# Patient Record
Sex: FEMALE | Race: WHITE | NOT HISPANIC OR LATINO | Employment: FULL TIME | URBAN - METROPOLITAN AREA
[De-identification: names, ages, dates, MRNs, and addresses within clinical notes are randomized per-mention and may not be internally consistent; named-entity substitution may affect disease eponyms.]

---

## 2024-06-13 ENCOUNTER — OCCMED (OUTPATIENT)
Age: 49
End: 2024-06-13

## 2024-06-13 ENCOUNTER — APPOINTMENT (OUTPATIENT)
Age: 49
End: 2024-06-13

## 2024-06-13 DIAGNOSIS — Z02.1 PHYSICAL EXAM, PRE-EMPLOYMENT: Primary | ICD-10-CM

## 2024-06-13 DIAGNOSIS — Z02.1 PRE-EMPLOYMENT DRUG SCREENING: ICD-10-CM

## 2024-06-13 LAB
MEV IGG SER QL IA: ABNORMAL
MUV IGG SER QL IA: NORMAL
RUBV IGG SERPL IA-ACNC: >450 IU/ML
VZV IGG SER QL IA: NORMAL

## 2024-06-13 PROCEDURE — 86735 MUMPS ANTIBODY: CPT

## 2024-06-13 PROCEDURE — 86765 RUBEOLA ANTIBODY: CPT

## 2024-06-13 PROCEDURE — 86787 VARICELLA-ZOSTER ANTIBODY: CPT

## 2024-06-13 PROCEDURE — 86480 TB TEST CELL IMMUN MEASURE: CPT

## 2024-06-13 PROCEDURE — 86762 RUBELLA ANTIBODY: CPT

## 2024-06-13 PROCEDURE — 36415 COLL VENOUS BLD VENIPUNCTURE: CPT

## 2024-06-14 LAB
GAMMA INTERFERON BACKGROUND BLD IA-ACNC: 0.03 IU/ML
M TB IFN-G BLD-IMP: NEGATIVE
M TB IFN-G CD4+ BCKGRND COR BLD-ACNC: 0.01 IU/ML
M TB IFN-G CD4+ BCKGRND COR BLD-ACNC: 0.01 IU/ML
MITOGEN IGNF BCKGRD COR BLD-ACNC: 9.97 IU/ML

## 2024-07-25 ENCOUNTER — OFFICE VISIT (OUTPATIENT)
Dept: WOUND CARE | Facility: HOSPITAL | Age: 49
End: 2024-07-25
Payer: COMMERCIAL

## 2024-07-25 VITALS
DIASTOLIC BLOOD PRESSURE: 84 MMHG | TEMPERATURE: 96.9 F | HEART RATE: 80 BPM | SYSTOLIC BLOOD PRESSURE: 120 MMHG | RESPIRATION RATE: 16 BRPM

## 2024-07-25 DIAGNOSIS — I83.022 VENOUS STASIS ULCER OF LEFT CALF WITH FAT LAYER EXPOSED, UNSPECIFIED WHETHER VARICOSE VEINS PRESENT (HCC): Primary | ICD-10-CM

## 2024-07-25 DIAGNOSIS — L97.222 VENOUS STASIS ULCER OF LEFT CALF WITH FAT LAYER EXPOSED, UNSPECIFIED WHETHER VARICOSE VEINS PRESENT (HCC): Primary | ICD-10-CM

## 2024-07-25 PROCEDURE — 99204 OFFICE O/P NEW MOD 45 MIN: CPT | Performed by: FAMILY MEDICINE

## 2024-07-25 PROCEDURE — 99213 OFFICE O/P EST LOW 20 MIN: CPT | Performed by: FAMILY MEDICINE

## 2024-07-25 PROCEDURE — 11042 DBRDMT SUBQ TIS 1ST 20SQCM/<: CPT | Performed by: FAMILY MEDICINE

## 2024-07-25 RX ORDER — SIMVASTATIN 20 MG
20 TABLET ORAL DAILY
COMMUNITY

## 2024-07-25 RX ORDER — LIDOCAINE HYDROCHLORIDE 40 MG/ML
5 SOLUTION TOPICAL ONCE
Status: COMPLETED | OUTPATIENT
Start: 2024-07-25 | End: 2024-07-25

## 2024-07-25 RX ORDER — TOPIRAMATE 50 MG/1
50 TABLET, FILM COATED ORAL DAILY
COMMUNITY

## 2024-07-25 RX ORDER — TOPIRAMATE 50 MG/1
50 CAPSULE, EXTENDED RELEASE ORAL DAILY
COMMUNITY

## 2024-07-25 RX ORDER — CETIRIZINE HYDROCHLORIDE 10 MG/1
10 TABLET ORAL DAILY
COMMUNITY

## 2024-07-25 RX ORDER — DESVENLAFAXINE 100 MG/1
100 TABLET, EXTENDED RELEASE ORAL DAILY
COMMUNITY

## 2024-07-25 RX ORDER — MONTELUKAST SODIUM 10 MG/1
10 TABLET ORAL
COMMUNITY

## 2024-07-25 RX ORDER — TRIAMCINOLONE ACETONIDE 55 UG/1
2 SPRAY, METERED NASAL DAILY
COMMUNITY

## 2024-07-25 RX ORDER — LAMOTRIGINE 25 MG/1
25 TABLET ORAL DAILY
COMMUNITY

## 2024-07-25 RX ADMIN — LIDOCAINE HYDROCHLORIDE 5 ML: 40 SOLUTION TOPICAL at 09:01

## 2024-07-25 NOTE — PATIENT INSTRUCTIONS
Orders Placed This Encounter   Procedures    Wound cleansing and dressings Venous Ulcer Left;Lateral Pretibial     Left lower leg wound    Wash your hands with soap and water.  Remove old dressing, discard into plastic bag and place in trash.  Cleanse the wound with soap and water, vashe prior to applying a clean dressing. Do not use tissue or cotton balls. Do not scrub the wound. Pat dry using gauze.  Shower yes   Apply betamethasone to skin surrounding wound today at [facility]  Apply polymem  to the  wound.  Cover with abd  Secure with adelia and tape, surepress  Change dressing every other day    Surepress    Apply compression wrap to your affected Leg(s) from mid-foot to knee making sure to cover the heel. Apply in the morning and re-wrap as needed during the day if wrap becomes loose. Remove at bedtime and elevate legs or lie down.    Avoid prolonged standing in one place.    Elevate leg(s) above the level of the heart when sitting or as much as possible.     Standing Status:   Future     Standing Expiration Date:   8/1/2024      
Photo Preface (Leave Blank If You Do Not Want): Photographs were obtained today
Detail Level: Zone

## 2024-07-25 NOTE — LETTER
July 25, 2024     Roxy Ag MD  [address] [facility]  [address] [place] [address]    Patient: Johanny Rodriguez   YOB: 1975   Date of Visit: 7/25/2024       Dear Dr. Ag:    Thank you for referring Johanny Rodriguez to me for evaluation. Below are my notes for this consultation.    If you have questions, please do not hesitate to call me. I look forward to following your patient along with you.         Sincerely,        Kodak Romero MD, CHT, CWS        CC: No Recipients    Kodak Romero MD  7/25/2024 10:07 AM  Sign when Signing Visit  6Patient ID: Johanny Rodriguez is a 49 y.o. female Date of Birth 1975       Chief Complaint   Patient presents with   • New Patient Visit     Left lower leg wound.       Allergies:  Antihistamines, diphenhydramine-type; Lactose - food allergy; and Latex    Diagnosis:      Diagnosis ICD-10-CM Associated Orders   1. Venous stasis ulcer of left calf with fat layer exposed, unspecified whether varicose veins present ([facility])  I83.022 lidocaine (XYLOCAINE) 4 % topical solution 5 mL    L97.222 Wound cleansing and dressings Venous Ulcer Left;Lateral Pretibial     Wound Procedure Treatment Venous Ulcer Left;Lateral Pretibial     Debridement                Assessment & Plan:  Venous stasis ulcer of the left lateral calf.  Venous duplex from 2023 indicates patent and competent greater and lesser saphenous veins.  There was reflux at the saphenofemoral junctions.  Surgical debridement.  PolyMem with Sure Press every other day.  Follow-up in 1 week.         Subjective:   7/25/2024: First visit for this 49-year-old female who comes to the wound center because of nonhealing ulceration on her left calf.  Patient has a history of ulceration on the same leg in 2023.  At that time she saw wound care in [place] with eventual healing.  She did have venous Duplex Dopplers with deep veins appearing competent bilaterally. The greater and lesser saphenous  veins appear patent and competent bilaterally. Reflux was detected in both   saphenofemoral junctions.  (2023)  In March of this year, she developed a small ulceration which has increased in size.  Over the last month or 2 there is been possible decrease in size slightly.  She describes a burning sensation.  She does wear 15 to 20 mm compression stocking.  Current treatment has been with a bordered, silver foam.  (Mepilex Ag)   There is no history of diabetes.          The following portions of the patient's history were reviewed and updated as appropriate:   There is no problem list on file for this patient.    Past Medical History:   Diagnosis Date   • Hypercholesterolemia      No past surgical history on file.  Family History   Problem Relation Age of Onset   • Heart disease Father    • Colon cancer Maternal Grandmother       Social History     Socioeconomic History   • Marital status: Single     Spouse name: None   • Number of children: None   • Years of education: None   • Highest education level: None   Occupational History   • None   Tobacco Use   • Smoking status: Never   • Smokeless tobacco: None   Vaping Use   • Vaping status: Never Used   Substance and Sexual Activity   • Alcohol use: None   • Drug use: Never   • Sexual activity: None   Other Topics Concern   • None   Social History Narrative   • None     Social Determinants of Health     Financial Resource Strain: Not on file   Food Insecurity: Not on file   Transportation Needs: Not on file   Physical Activity: Not on file   Stress: Not on file   Social Connections: Not on file   Intimate Partner Violence: Not on file   Housing Stability: Not on file        Current Outpatient Medications:   •  cetirizine (ZyrTEC Allergy) 10 mg tablet, Take 10 mg by mouth daily, Disp: , Rfl:   •  desvenlafaxine (PRISTIQ) 100 mg 24 hr tablet, Take 100 mg by mouth daily, Disp: , Rfl:   •  LaMICtal 25 MG tablet, Take 25 mg by mouth daily, Disp: , Rfl:   •  montelukast  (SINGULAIR) 10 mg tablet, Take 10 mg by mouth daily at bedtime, Disp: , Rfl:   •  Multiple Vitamins-Minerals (MULTIVITAMIN ADULTS 50+ PO), Multivitamin, Disp: , Rfl:   •  simvastatin (ZOCOR) 20 mg tablet, Take 20 mg by mouth daily, Disp: , Rfl:   •  topiramate (TOPAMAX) 50 MG tablet, Take 50 mg by mouth daily, Disp: , Rfl:   •  Topiramate ER 50 MG CP24, Take 50 mg by mouth daily, Disp: , Rfl:   •  Triamcinolone Acetonide (Nasacort Allergy) 55 MCG/ACT nasal spray, 2 sprays into each nostril daily, Disp: , Rfl:   No current facility-administered medications for this visit.    Review of Systems   Constitutional:  Negative for appetite change, chills, fatigue, fever and unexpected weight change.   HENT:  Negative for congestion, hearing loss and postnasal drip.    Respiratory:  Negative for cough and shortness of breath.    Cardiovascular:  Positive for leg swelling (Slightly on the left especially at the ankle and area of previous surgery).   Musculoskeletal:  Negative for gait problem.   Skin:  Positive for wound (Left calf). Negative for rash.   Neurological:  Negative for numbness.   Hematological:  Does not bruise/bleed easily.   Psychiatric/Behavioral:  Positive for dysphoric mood. The patient is nervous/anxious.        Objective:  /84   Pulse 80   Temp (!) 96.9 °F (36.1 °C)   Resp 16   Pain Score:   2     Physical Exam  Vitals and nursing note reviewed.   Constitutional:       Appearance: Normal appearance. She is well-developed.   HENT:      Head: Normocephalic and atraumatic.   Cardiovascular:      Rate and Rhythm: Normal rate.   Pulmonary:      Effort: Pulmonary effort is normal.   Musculoskeletal:      Left lower le+ Edema present.   Skin:     General: Skin is warm and dry.      Findings: Erythema and wound present.             Comments: Irregular ulceration with areas of slough and some epithelization.  Surrounding erythema consistent with contact dermatitis due to adhesive.  Small to moderate  "serosanguineous drainage.  No malodor and no signs of infection.  Dusky discoloration lateral to the ulcer.  Areas of hyperpigmentation from previous ulcers.   Neurological:      Mental Status: She is alert and oriented to person, place, and time.   Psychiatric:         Attention and Perception: Attention normal.         Mood and Affect: Mood and affect normal.         Behavior: Behavior is cooperative.         Cognition and Memory: Cognition normal.         Wound 07/25/24 Venous Ulcer Pretibial Left;Lateral (Active)   Wound Image Images linked 07/25/24 0937   Wound Description Granulation tissue;Yellow 07/25/24 0837   Alona-wound Assessment Edema;Erythema 07/25/24 0837   Wound Length (cm) 5.6 cm 07/25/24 0837   Wound Width (cm) 3 cm 07/25/24 0837   Wound Depth (cm) 0.2 cm 07/25/24 0837   Wound Surface Area (cm^2) 16.8 cm^2 07/25/24 0837   Wound Volume (cm^3) 3.36 cm^3 07/25/24 0837   Calculated Wound Volume (cm^3) 3.36 cm^3 07/25/24 0837   Drainage Amount Moderate 07/25/24 0837   Drainage Description Serosanguineous 07/25/24 0837   Non-staged Wound Description Full thickness 07/25/24 0837   Dressing Status Intact 07/25/24 0837        Debridement   Wound 07/25/24 Venous Ulcer Pretibial Left;Lateral    Universal Protocol:  Consent: Verbal consent obtained. Written consent obtained.  Consent given by: patient  Time out: Immediately prior to procedure a \"time out\" was called to verify the correct patient, procedure, equipment, support staff and site/side marked as required.  Patient understanding: patient states understanding of the procedure being performed  Patient identity confirmed: verbally with patient    Debridement Details  Performed by: physician  Debridement type: surgical  Level of debridement: subcutaneous tissue  Pain control: lidocaine 4%      Post-debridement measurements  Length (cm): 5.6  Width (cm): 3  Depth (cm): 0.3  Percent debrided: 100%  Surface Area (cm^2): 16.8  Area Debrided (cm^2): " "16.8  Volume (cm^3): 5.04    Tissue and other material debrided: dermis and subcutaneous tissue  Devitalized tissue debrided: fibrin and slough  Instrument(s) utilized: curette  Bleeding: medium  Hemostasis obtained with: pressure  Procedural pain (0-10): 0  Post-procedural pain: 0   Response to treatment: procedure was tolerated well  Debridement Comments: Irrigation with saline postdebridement.             Lab Results   Component Value Date    HGBA1C 5.5 08/18/2023       Wound Instructions:  Orders Placed This Encounter   Procedures   • Wound cleansing and dressings Venous Ulcer Left;Lateral Pretibial     Left lower leg wound    Wash your hands with soap and water.  Remove old dressing, discard into plastic bag and place in trash.  Cleanse the wound with soap and water, vashe prior to applying a clean dressing. Do not use tissue or cotton balls. Do not scrub the wound. Pat dry using gauze.  Shower yes   Apply betamethasone to skin surrounding wound today at [facility]  Apply polymem  to the  wound.  Cover with abd  Secure with adelia and tape, surepress  Change dressing every other day    Surepress    Apply compression wrap to your affected Leg(s) from mid-foot to knee making sure to cover the heel. Apply in the morning and re-wrap as needed during the day if wrap becomes loose. Remove at bedtime and elevate legs or lie down.    Avoid prolonged standing in one place.    Elevate leg(s) above the level of the heart when sitting or as much as possible.     Standing Status:   Future     Standing Expiration Date:   8/1/2024   • Wound Procedure Treatment Venous Ulcer Left;Lateral Pretibial     This order was created via procedure documentation   • Debridement     This order was created via procedure documentation               Kodak Romero MD, CHT, CWS    Portions of the record may have been created with voice recognition software. Occasional wrong word or \"sound alike\" substitutions may have occurred due to the " inherent limitations of voice recognition software. Read the chart carefully and recognize, using context, where substitutions have occurred.

## 2024-07-25 NOTE — PROGRESS NOTES
Wound Procedure Treatment Venous Ulcer Left;Lateral Pretibial    Performed by: Chelle Tejada RN  Authorized by: Kodak Romero MD    Associated wounds:   Wound 07/25/24 Venous Ulcer Pretibial Left;Lateral  Wound cleansed with:  Soap and water  Applied primary dressing:  Polymem foam  Applied secondary dressing:  ABD  Dressing secured with:  Adelia and Tape  Comments:  Betamethasone applied to naomy wound today.  Polymem applied to wound bed, abd, adelia and tape surepress.

## 2024-07-25 NOTE — LETTER
July 25, 2024     Roxy Ag MD  [address] [facility]  [address] [place] [address]    Patient: Johanny Rodriguez   YOB: 1975   Date of Visit: 7/25/2024       Dear Dr. Ag:    Thank you for referring Johanny Rodriguez to me for evaluation. Below are my notes for this consultation.    If you have questions, please do not hesitate to call me. I look forward to following your patient along with you.         Sincerely,        Kodak Romero MD, CHT, CWS        CC: No Recipients    Kodak Romero MD  7/25/2024 10:07 AM  Sign when Signing Visit  6Patient ID: Johanny Rodriguez is a 49 y.o. female Date of Birth 1975       Chief Complaint   Patient presents with   • New Patient Visit     Left lower leg wound.       Allergies:  Antihistamines, diphenhydramine-type; Lactose - food allergy; and Latex    Diagnosis:      Diagnosis ICD-10-CM Associated Orders   1. Venous stasis ulcer of left calf with fat layer exposed, unspecified whether varicose veins present ([facility])  I83.022 lidocaine (XYLOCAINE) 4 % topical solution 5 mL    L97.222 Wound cleansing and dressings Venous Ulcer Left;Lateral Pretibial     Wound Procedure Treatment Venous Ulcer Left;Lateral Pretibial     Debridement                Assessment & Plan:  Venous stasis ulcer of the left lateral calf.  Venous duplex from 2023 indicates patent and competent greater and lesser saphenous veins.  There was reflux at the saphenofemoral junctions.  Surgical debridement.  PolyMem with Sure Press every other day.  Follow-up in 1 week.         Subjective:   7/25/2024: First visit for this 49-year-old female who comes to the wound center because of nonhealing ulceration on her left calf.  Patient has a history of ulceration on the same leg in 2023.  At that time she saw wound care in [place] with eventual healing.  She did have venous Duplex Dopplers with deep veins appearing competent bilaterally. The greater and lesser saphenous  veins appear patent and competent bilaterally. Reflux was detected in both   saphenofemoral junctions.  (2023)  In March of this year, she developed a small ulceration which has increased in size.  Over the last month or 2 there is been possible decrease in size slightly.  She describes a burning sensation.  She does wear 15 to 20 mm compression stocking.  Current treatment has been with a bordered, silver foam.  (Mepilex Ag)   There is no history of diabetes.          The following portions of the patient's history were reviewed and updated as appropriate:   There is no problem list on file for this patient.    Past Medical History:   Diagnosis Date   • Hypercholesterolemia      No past surgical history on file.  Family History   Problem Relation Age of Onset   • Heart disease Father    • Colon cancer Maternal Grandmother       Social History     Socioeconomic History   • Marital status: Single     Spouse name: None   • Number of children: None   • Years of education: None   • Highest education level: None   Occupational History   • None   Tobacco Use   • Smoking status: Never   • Smokeless tobacco: None   Vaping Use   • Vaping status: Never Used   Substance and Sexual Activity   • Alcohol use: None   • Drug use: Never   • Sexual activity: None   Other Topics Concern   • None   Social History Narrative   • None     Social Determinants of Health     Financial Resource Strain: Not on file   Food Insecurity: Not on file   Transportation Needs: Not on file   Physical Activity: Not on file   Stress: Not on file   Social Connections: Not on file   Intimate Partner Violence: Not on file   Housing Stability: Not on file        Current Outpatient Medications:   •  cetirizine (ZyrTEC Allergy) 10 mg tablet, Take 10 mg by mouth daily, Disp: , Rfl:   •  desvenlafaxine (PRISTIQ) 100 mg 24 hr tablet, Take 100 mg by mouth daily, Disp: , Rfl:   •  LaMICtal 25 MG tablet, Take 25 mg by mouth daily, Disp: , Rfl:   •  montelukast  (SINGULAIR) 10 mg tablet, Take 10 mg by mouth daily at bedtime, Disp: , Rfl:   •  Multiple Vitamins-Minerals (MULTIVITAMIN ADULTS 50+ PO), Multivitamin, Disp: , Rfl:   •  simvastatin (ZOCOR) 20 mg tablet, Take 20 mg by mouth daily, Disp: , Rfl:   •  topiramate (TOPAMAX) 50 MG tablet, Take 50 mg by mouth daily, Disp: , Rfl:   •  Topiramate ER 50 MG CP24, Take 50 mg by mouth daily, Disp: , Rfl:   •  Triamcinolone Acetonide (Nasacort Allergy) 55 MCG/ACT nasal spray, 2 sprays into each nostril daily, Disp: , Rfl:   No current facility-administered medications for this visit.    Review of Systems   Constitutional:  Negative for appetite change, chills, fatigue, fever and unexpected weight change.   HENT:  Negative for congestion, hearing loss and postnasal drip.    Respiratory:  Negative for cough and shortness of breath.    Cardiovascular:  Positive for leg swelling (Slightly on the left especially at the ankle and area of previous surgery).   Musculoskeletal:  Negative for gait problem.   Skin:  Positive for wound (Left calf). Negative for rash.   Neurological:  Negative for numbness.   Hematological:  Does not bruise/bleed easily.   Psychiatric/Behavioral:  Positive for dysphoric mood. The patient is nervous/anxious.        Objective:  /84   Pulse 80   Temp (!) 96.9 °F (36.1 °C)   Resp 16   Pain Score:   2     Physical Exam  Vitals and nursing note reviewed.   Constitutional:       Appearance: Normal appearance. She is well-developed.   HENT:      Head: Normocephalic and atraumatic.   Cardiovascular:      Rate and Rhythm: Normal rate.   Pulmonary:      Effort: Pulmonary effort is normal.   Musculoskeletal:      Left lower le+ Edema present.   Skin:     General: Skin is warm and dry.      Findings: Erythema and wound present.             Comments: Irregular ulceration with areas of slough and some epithelization.  Surrounding erythema consistent with contact dermatitis due to adhesive.  Small to moderate  "serosanguineous drainage.  No malodor and no signs of infection.  Dusky discoloration lateral to the ulcer.  Areas of hyperpigmentation from previous ulcers.   Neurological:      Mental Status: She is alert and oriented to person, place, and time.   Psychiatric:         Attention and Perception: Attention normal.         Mood and Affect: Mood and affect normal.         Behavior: Behavior is cooperative.         Cognition and Memory: Cognition normal.         Wound 07/25/24 Venous Ulcer Pretibial Left;Lateral (Active)   Wound Image Images linked 07/25/24 0937   Wound Description Granulation tissue;Yellow 07/25/24 0837   Alona-wound Assessment Edema;Erythema 07/25/24 0837   Wound Length (cm) 5.6 cm 07/25/24 0837   Wound Width (cm) 3 cm 07/25/24 0837   Wound Depth (cm) 0.2 cm 07/25/24 0837   Wound Surface Area (cm^2) 16.8 cm^2 07/25/24 0837   Wound Volume (cm^3) 3.36 cm^3 07/25/24 0837   Calculated Wound Volume (cm^3) 3.36 cm^3 07/25/24 0837   Drainage Amount Moderate 07/25/24 0837   Drainage Description Serosanguineous 07/25/24 0837   Non-staged Wound Description Full thickness 07/25/24 0837   Dressing Status Intact 07/25/24 0837        Debridement   Wound 07/25/24 Venous Ulcer Pretibial Left;Lateral    Universal Protocol:  Consent: Verbal consent obtained. Written consent obtained.  Consent given by: patient  Time out: Immediately prior to procedure a \"time out\" was called to verify the correct patient, procedure, equipment, support staff and site/side marked as required.  Patient understanding: patient states understanding of the procedure being performed  Patient identity confirmed: verbally with patient    Debridement Details  Performed by: physician  Debridement type: surgical  Level of debridement: subcutaneous tissue  Pain control: lidocaine 4%      Post-debridement measurements  Length (cm): 5.6  Width (cm): 3  Depth (cm): 0.3  Percent debrided: 100%  Surface Area (cm^2): 16.8  Area Debrided (cm^2): " "16.8  Volume (cm^3): 5.04    Tissue and other material debrided: dermis and subcutaneous tissue  Devitalized tissue debrided: fibrin and slough  Instrument(s) utilized: curette  Bleeding: medium  Hemostasis obtained with: pressure  Procedural pain (0-10): 0  Post-procedural pain: 0   Response to treatment: procedure was tolerated well  Debridement Comments: Irrigation with saline postdebridement.             Lab Results   Component Value Date    HGBA1C 5.5 08/18/2023       Wound Instructions:  Orders Placed This Encounter   Procedures   • Wound cleansing and dressings Venous Ulcer Left;Lateral Pretibial     Left lower leg wound    Wash your hands with soap and water.  Remove old dressing, discard into plastic bag and place in trash.  Cleanse the wound with soap and water, vashe prior to applying a clean dressing. Do not use tissue or cotton balls. Do not scrub the wound. Pat dry using gauze.  Shower yes   Apply betamethasone to skin surrounding wound today at [facility]  Apply polymem  to the  wound.  Cover with abd  Secure with adelia and tape, surepress  Change dressing every other day    Surepress    Apply compression wrap to your affected Leg(s) from mid-foot to knee making sure to cover the heel. Apply in the morning and re-wrap as needed during the day if wrap becomes loose. Remove at bedtime and elevate legs or lie down.    Avoid prolonged standing in one place.    Elevate leg(s) above the level of the heart when sitting or as much as possible.     Standing Status:   Future     Standing Expiration Date:   8/1/2024   • Wound Procedure Treatment Venous Ulcer Left;Lateral Pretibial     This order was created via procedure documentation   • Debridement     This order was created via procedure documentation               Kodak Romero MD, CHT, CWS    Portions of the record may have been created with voice recognition software. Occasional wrong word or \"sound alike\" substitutions may have occurred due to the " inherent limitations of voice recognition software. Read the chart carefully and recognize, using context, where substitutions have occurred.

## 2024-07-25 NOTE — PROGRESS NOTES
6Patient ID: Johanny Rodriguez is a 49 y.o. female Date of Birth 1975       Chief Complaint   Patient presents with   • New Patient Visit     Left lower leg wound.       Allergies:  Antihistamines, diphenhydramine-type; Lactose - food allergy; and Latex    Diagnosis:      Diagnosis ICD-10-CM Associated Orders   1. Venous stasis ulcer of left calf with fat layer exposed, unspecified whether varicose veins present ([facility])  I83.022 lidocaine (XYLOCAINE) 4 % topical solution 5 mL    L97.222 Wound cleansing and dressings Venous Ulcer Left;Lateral Pretibial     Wound Procedure Treatment Venous Ulcer Left;Lateral Pretibial     Debridement Venous Ulcer Left;Lateral Pretibial                Assessment & Plan:  Venous stasis ulcer of the left lateral calf.  Venous duplex from 2023 indicates patent and competent greater and lesser saphenous veins.  There was reflux at the saphenofemoral junctions.  Surgical debridement.  PolyMem with Sure Press every other day.  Follow-up in 1 week.         Subjective:   7/25/2024: First visit for this 49-year-old female who comes to the wound center because of nonhealing ulceration on her left calf.  Patient has a history of ulceration on the same leg in 2023.  At that time she saw wound care in [place] with eventual healing.  She did have venous Duplex Dopplers with deep veins appearing competent bilaterally. The greater and lesser saphenous veins appear patent and competent bilaterally. Reflux was detected in both   saphenofemoral junctions.  (2023)  In March of this year, she developed a small ulceration which has increased in size.  Over the last month or 2 there is been possible decrease in size slightly.  She describes a burning sensation.  She does wear 15 to 20 mm compression stocking.  Current treatment has been with a bordered, silver foam.  (Mepilex Ag)   There is no history of diabetes.          The following portions of the patient's history were reviewed and updated as  appropriate:   There is no problem list on file for this patient.    Past Medical History:   Diagnosis Date   • Hypercholesterolemia      No past surgical history on file.  Family History   Problem Relation Age of Onset   • Heart disease Father    • Colon cancer Maternal Grandmother       Social History     Socioeconomic History   • Marital status: Single     Spouse name: None   • Number of children: None   • Years of education: None   • Highest education level: None   Occupational History   • None   Tobacco Use   • Smoking status: Never   • Smokeless tobacco: None   Vaping Use   • Vaping status: Never Used   Substance and Sexual Activity   • Alcohol use: None   • Drug use: Never   • Sexual activity: None   Other Topics Concern   • None   Social History Narrative   • None     Social Determinants of Health     Financial Resource Strain: Not on file   Food Insecurity: Not on file   Transportation Needs: Not on file   Physical Activity: Not on file   Stress: Not on file   Social Connections: Not on file   Intimate Partner Violence: Not on file   Housing Stability: Not on file        Current Outpatient Medications:   •  cetirizine (ZyrTEC Allergy) 10 mg tablet, Take 10 mg by mouth daily, Disp: , Rfl:   •  desvenlafaxine (PRISTIQ) 100 mg 24 hr tablet, Take 100 mg by mouth daily, Disp: , Rfl:   •  LaMICtal 25 MG tablet, Take 25 mg by mouth daily, Disp: , Rfl:   •  montelukast (SINGULAIR) 10 mg tablet, Take 10 mg by mouth daily at bedtime, Disp: , Rfl:   •  Multiple Vitamins-Minerals (MULTIVITAMIN ADULTS 50+ PO), Multivitamin, Disp: , Rfl:   •  simvastatin (ZOCOR) 20 mg tablet, Take 20 mg by mouth daily, Disp: , Rfl:   •  topiramate (TOPAMAX) 50 MG tablet, Take 50 mg by mouth daily, Disp: , Rfl:   •  Topiramate ER 50 MG CP24, Take 50 mg by mouth daily, Disp: , Rfl:   •  Triamcinolone Acetonide (Nasacort Allergy) 55 MCG/ACT nasal spray, 2 sprays into each nostril daily, Disp: , Rfl:   No current facility-administered  medications for this visit.    Review of Systems   Constitutional:  Negative for appetite change, chills, fatigue, fever and unexpected weight change.   HENT:  Negative for congestion, hearing loss and postnasal drip.    Respiratory:  Negative for cough and shortness of breath.    Cardiovascular:  Positive for leg swelling (Slightly on the left especially at the ankle and area of previous surgery).   Musculoskeletal:  Negative for gait problem.   Skin:  Positive for wound (Left calf). Negative for rash.   Neurological:  Negative for numbness.   Hematological:  Does not bruise/bleed easily.   Psychiatric/Behavioral:  Positive for dysphoric mood. The patient is nervous/anxious.        Objective:  /84   Pulse 80   Temp (!) 96.9 °F (36.1 °C)   Resp 16   Pain Score:   2     Physical Exam  Vitals and nursing note reviewed.   Constitutional:       Appearance: Normal appearance. She is well-developed.   HENT:      Head: Normocephalic and atraumatic.   Cardiovascular:      Rate and Rhythm: Normal rate.   Pulmonary:      Effort: Pulmonary effort is normal.   Musculoskeletal:      Left lower le+ Edema present.   Skin:     General: Skin is warm and dry.      Findings: Erythema and wound present.             Comments: Irregular ulceration with areas of slough and some epithelization.  Surrounding erythema consistent with contact dermatitis due to adhesive.  Small to moderate serosanguineous drainage.  No malodor and no signs of infection.  Dusky discoloration lateral to the ulcer.  Areas of hyperpigmentation from previous ulcers.   Neurological:      Mental Status: She is alert and oriented to person, place, and time.   Psychiatric:         Attention and Perception: Attention normal.         Mood and Affect: Mood and affect normal.         Behavior: Behavior is cooperative.         Cognition and Memory: Cognition normal.         Wound 24 Venous Ulcer Pretibial Left;Lateral (Active)   Wound Image Images linked  "07/25/24 0937   Wound Description Granulation tissue;Yellow 07/25/24 0837   Alona-wound Assessment Edema;Erythema 07/25/24 0837   Wound Length (cm) 5.6 cm 07/25/24 0837   Wound Width (cm) 3 cm 07/25/24 0837   Wound Depth (cm) 0.2 cm 07/25/24 0837   Wound Surface Area (cm^2) 16.8 cm^2 07/25/24 0837   Wound Volume (cm^3) 3.36 cm^3 07/25/24 0837   Calculated Wound Volume (cm^3) 3.36 cm^3 07/25/24 0837   Drainage Amount Moderate 07/25/24 0837   Drainage Description Serosanguineous 07/25/24 0837   Non-staged Wound Description Full thickness 07/25/24 0837   Dressing Status Intact 07/25/24 0837        Debridement   Wound 07/25/24 Venous Ulcer Pretibial Left;Lateral    Universal Protocol:  Consent: Verbal consent obtained. Written consent obtained.  Consent given by: patient  Time out: Immediately prior to procedure a \"time out\" was called to verify the correct patient, procedure, equipment, support staff and site/side marked as required.  Patient understanding: patient states understanding of the procedure being performed  Patient identity confirmed: verbally with patient    Debridement Details  Performed by: physician  Debridement type: surgical  Level of debridement: subcutaneous tissue  Pain control: lidocaine 4%      Post-debridement measurements  Length (cm): 5.6  Width (cm): 3  Depth (cm): 0.3  Percent debrided: 100%  Surface Area (cm^2): 16.8  Area Debrided (cm^2): 16.8  Volume (cm^3): 5.04    Tissue and other material debrided: dermis and subcutaneous tissue  Devitalized tissue debrided: fibrin and slough  Instrument(s) utilized: curette  Bleeding: medium  Hemostasis obtained with: pressure  Procedural pain (0-10): 0  Post-procedural pain: 0   Response to treatment: procedure was tolerated well  Debridement Comments: Irrigation with saline postdebridement.             Lab Results   Component Value Date    HGBA1C 5.5 08/18/2023       Wound Instructions:  Orders Placed This Encounter   Procedures   • Wound cleansing " "and dressings Venous Ulcer Left;Lateral Pretibial     Left lower leg wound    Wash your hands with soap and water.  Remove old dressing, discard into plastic bag and place in trash.  Cleanse the wound with soap and water, vashe prior to applying a clean dressing. Do not use tissue or cotton balls. Do not scrub the wound. Pat dry using gauze.  Shower yes   Apply betamethasone to skin surrounding wound today at [facility]  Apply polymem  to the  wound.  Cover with abd  Secure with adelia and tape, surepress  Change dressing every other day    Surepress    Apply compression wrap to your affected Leg(s) from mid-foot to knee making sure to cover the heel. Apply in the morning and re-wrap as needed during the day if wrap becomes loose. Remove at bedtime and elevate legs or lie down.    Avoid prolonged standing in one place.    Elevate leg(s) above the level of the heart when sitting or as much as possible.     Standing Status:   Future     Standing Expiration Date:   8/1/2024   • Wound Procedure Treatment Venous Ulcer Left;Lateral Pretibial     This order was created via procedure documentation   • Debridement Venous Ulcer Left;Lateral Pretibial     This order was created via procedure documentation               Kodak Romero MD, CHT, CWS    Portions of the record may have been created with voice recognition software. Occasional wrong word or \"sound alike\" substitutions may have occurred due to the inherent limitations of voice recognition software. Read the chart carefully and recognize, using context, where substitutions have occurred.    "

## 2024-08-01 ENCOUNTER — OFFICE VISIT (OUTPATIENT)
Dept: WOUND CARE | Facility: HOSPITAL | Age: 49
End: 2024-08-01
Payer: COMMERCIAL

## 2024-08-01 VITALS
DIASTOLIC BLOOD PRESSURE: 82 MMHG | RESPIRATION RATE: 18 BRPM | HEART RATE: 84 BPM | SYSTOLIC BLOOD PRESSURE: 118 MMHG | TEMPERATURE: 98.5 F

## 2024-08-01 DIAGNOSIS — I83.022 VENOUS STASIS ULCER OF LEFT CALF WITH FAT LAYER EXPOSED, UNSPECIFIED WHETHER VARICOSE VEINS PRESENT (HCC): Primary | ICD-10-CM

## 2024-08-01 DIAGNOSIS — L97.222 VENOUS STASIS ULCER OF LEFT CALF WITH FAT LAYER EXPOSED, UNSPECIFIED WHETHER VARICOSE VEINS PRESENT (HCC): Primary | ICD-10-CM

## 2024-08-01 PROCEDURE — 11045 DBRDMT SUBQ TISS EACH ADDL: CPT | Performed by: FAMILY MEDICINE

## 2024-08-01 PROCEDURE — 11042 DBRDMT SUBQ TIS 1ST 20SQCM/<: CPT | Performed by: FAMILY MEDICINE

## 2024-08-01 RX ORDER — LIDOCAINE HYDROCHLORIDE 40 MG/ML
5 SOLUTION TOPICAL ONCE
Status: COMPLETED | OUTPATIENT
Start: 2024-08-01 | End: 2024-08-01

## 2024-08-01 RX ADMIN — LIDOCAINE HYDROCHLORIDE 5 ML: 40 SOLUTION TOPICAL at 15:24

## 2024-08-01 NOTE — PATIENT INSTRUCTIONS
Orders Placed This Encounter   Procedures    Wound cleansing and dressings Venous Ulcer Left;Lateral Pretibial     Left lower leg wound     Wash your hands with soap and water.  Remove old dressing, discard into plastic bag and place in trash. Cleanse the wound with soap and water, vashe prior to applying a clean dressing. Do not use tissue or cotton balls. Do not scrub the wound. Pat dry using gauze.  Shower yes       Apply Endoform AM to wound followed by Hydrafera blue  Cover with abd  Secure with adelia and tape, surepress  Change dressing every other day     Surepress     Apply compression wrap to your affected Leg(s) from mid-foot to knee making sure to cover the heel. Apply in the morning and re-wrap as needed during the day if wrap becomes loose. Remove at bedtime and elevate legs or lie down.     Avoid prolonged standing in one place.     Elevate leg(s) above the level of the heart when sitting or as much as possible.     Standing Status:   Future     Standing Expiration Date:   8/8/2024

## 2024-08-01 NOTE — PROGRESS NOTES
Wound Procedure Treatment Venous Ulcer Left;Lateral Pretibial    Performed by: Ann Marie Luciano RN  Authorized by: Kodak Romero MD    Associated wounds:   Wound 07/25/24 Venous Ulcer Pretibial Left;Lateral  Wound cleansed with:  NSS  Applied primary dressing:  Hydrafera blue and Collagen dressing  Applied secondary dressing:  ABD  Dressing secured with:  Adri, Tape and Compression wrap    Endoform AM applied to wound covered by hydrafera blue    Setopress applied for compression

## 2024-08-01 NOTE — PROGRESS NOTES
Patient ID: Johanny Rodriguez is a 49 y.o. female Date of Birth 1975       Chief Complaint   Patient presents with   • Follow Up Wound Care Visit     Left leg wound       Allergies:  Antihistamines, diphenhydramine-type; Lactose - food allergy; and Latex    Diagnosis:      Diagnosis ICD-10-CM Associated Orders   1. Venous stasis ulcer of left calf with fat layer exposed, unspecified whether varicose veins present ([facility])  I83.022 lidocaine (XYLOCAINE) 4 % topical solution 5 mL    L97.222 Wound cleansing and dressings Venous Ulcer Left;Lateral Pretibial     Wound Procedure Treatment Venous Ulcer Left;Lateral Pretibial     Debridement Venous Ulcer Left;Lateral Pretibial              Assessment & Plan:  Venous stasis ulcer of the left calf.  No significant changes.  Unfortunately, insurance would not cover previously ordered dressings.  Was not notified until today.  Surgical debridement.  Endoform or Julia followed by Hydrofera Blue.  SurePress.  At this time cannot use formal compression wrap because of moderate drainage.  Follow-up in 1 week.         Subjective:   8/1/2024: Follow-up venous stasis ulcer of the left calf.  Unfortunately, she did not receive her ordered dressings.  We only found out today that they were not covered.  Using SurePress but does not really like it.  She notes moderate drainage.  No fever or chills.  No increased pain.    7/25/2024: First visit for this 49-year-old female who comes to the wound center because of nonhealing ulceration on her left calf.  Patient has a history of ulceration on the same leg in 2023.  At that time she saw wound care in [place] with eventual healing.  She did have venous Duplex Dopplers with deep veins appearing competent bilaterally. The greater and lesser saphenous veins appear patent and competent bilaterally. Reflux was detected in both   saphenofemoral junctions.  (2023)  In March of this year, she developed a small ulceration which has increased in  size.  Over the last month or 2 there is been possible decrease in size slightly.  She describes a burning sensation.  She does wear 15 to 20 mm compression stocking.  Current treatment has been with a bordered, silver foam.  (Mepilex Ag)   There is no history of diabetes.          The following portions of the patient's history were reviewed and updated as appropriate:   There is no problem list on file for this patient.    Past Medical History:   Diagnosis Date   • Hypercholesterolemia      No past surgical history on file.  Family History   Problem Relation Age of Onset   • Heart disease Father    • Colon cancer Maternal Grandmother       Social History     Socioeconomic History   • Marital status: Single     Spouse name: Not on file   • Number of children: Not on file   • Years of education: Not on file   • Highest education level: Not on file   Occupational History   • Not on file   Tobacco Use   • Smoking status: Never   • Smokeless tobacco: Not on file   Vaping Use   • Vaping status: Never Used   Substance and Sexual Activity   • Alcohol use: Not on file   • Drug use: Never   • Sexual activity: Not on file   Other Topics Concern   • Not on file   Social History Narrative   • Not on file     Social Determinants of Health     Financial Resource Strain: Not on file   Food Insecurity: Not on file   Transportation Needs: Not on file   Physical Activity: Not on file   Stress: Not on file   Social Connections: Not on file   Intimate Partner Violence: Not on file   Housing Stability: Not on file        Current Outpatient Medications:   •  cetirizine (ZyrTEC Allergy) 10 mg tablet, Take 10 mg by mouth daily, Disp: , Rfl:   •  desvenlafaxine (PRISTIQ) 100 mg 24 hr tablet, Take 100 mg by mouth daily, Disp: , Rfl:   •  LaMICtal 25 MG tablet, Take 25 mg by mouth daily, Disp: , Rfl:   •  montelukast (SINGULAIR) 10 mg tablet, Take 10 mg by mouth daily at bedtime, Disp: , Rfl:   •  Multiple Vitamins-Minerals (MULTIVITAMIN  ADULTS 50+ PO), Multivitamin, Disp: , Rfl:   •  simvastatin (ZOCOR) 20 mg tablet, Take 20 mg by mouth daily, Disp: , Rfl:   •  topiramate (TOPAMAX) 50 MG tablet, Take 50 mg by mouth daily, Disp: , Rfl:   •  Topiramate ER 50 MG CP24, Take 50 mg by mouth daily, Disp: , Rfl:   •  Triamcinolone Acetonide (Nasacort Allergy) 55 MCG/ACT nasal spray, 2 sprays into each nostril daily, Disp: , Rfl:   No current facility-administered medications for this visit.    Review of Systems   Constitutional:  Negative for appetite change, chills, fatigue, fever and unexpected weight change.   HENT:  Negative for congestion, hearing loss and postnasal drip.    Respiratory:  Negative for cough and shortness of breath.    Cardiovascular:  Positive for leg swelling (Slightly on the left especially at the ankle and area of previous surgery).   Musculoskeletal:  Negative for gait problem.   Skin:  Positive for wound (Left calf). Negative for rash.   Neurological:  Negative for numbness.   Hematological:  Does not bruise/bleed easily.   Psychiatric/Behavioral:  Positive for dysphoric mood. The patient is not nervous/anxious.        Objective:  /82   Pulse 84   Temp 98.5 °F (36.9 °C)   Resp 18   Pain Score:   2     Physical Exam  Vitals and nursing note reviewed.   Constitutional:       Appearance: Normal appearance. She is well-developed.   HENT:      Head: Normocephalic and atraumatic.   Cardiovascular:      Rate and Rhythm: Normal rate.   Pulmonary:      Effort: Pulmonary effort is normal.   Musculoskeletal:      Left lower le+ Edema present.   Skin:     General: Skin is warm and dry.      Findings: Erythema and wound present.             Comments: Irregular ulceration with areas of slough.    Moderate serosanguineous drainage.  No malodor and no signs of infection.  Dusky discoloration lateral to the ulcer.  Areas of hyperpigmentation from previous ulcers.   Neurological:      Mental Status: She is alert and oriented to  "person, place, and time.   Psychiatric:         Attention and Perception: Attention normal.         Mood and Affect: Mood and affect normal.         Behavior: Behavior is cooperative.         Cognition and Memory: Cognition normal.         Wound 07/25/24 Venous Ulcer Pretibial Left;Lateral (Active)   Wound Image Images linked 08/01/24 1555   Wound Description Epithelialization;Granulation tissue;Yellow;Hypergranulation 08/01/24 1520   Alona-wound Assessment Edema;Hyperpigmented 08/01/24 1520   Wound Length (cm) 6.5 cm 08/01/24 1520   Wound Width (cm) 3.6 cm 08/01/24 1520   Wound Depth (cm) 0.1 cm 08/01/24 1520   Wound Surface Area (cm^2) 23.4 cm^2 08/01/24 1520   Wound Volume (cm^3) 2.34 cm^3 08/01/24 1520   Calculated Wound Volume (cm^3) 2.34 cm^3 08/01/24 1520   Change in Wound Size % 30.36 08/01/24 1520   Drainage Amount Moderate 08/01/24 1520   Drainage Description Serosanguineous 08/01/24 1520   Non-staged Wound Description Full thickness 08/01/24 1520   Dressing Status Intact 08/01/24 1520        Debridement   Wound 07/25/24 Venous Ulcer Pretibial Left;Lateral    Universal Protocol:  Consent: Verbal consent obtained. Written consent obtained.  Consent given by: patient  Time out: Immediately prior to procedure a \"time out\" was called to verify the correct patient, procedure, equipment, support staff and site/side marked as required.  Patient understanding: patient states understanding of the procedure being performed  Patient identity confirmed: verbally with patient    Debridement Details  Performed by: physician  Debridement type: surgical  Level of debridement: subcutaneous tissue  Pain control: lidocaine 4%      Post-debridement measurements  Length (cm): 6.5  Width (cm): 3.6  Depth (cm): 0.2  Percent debrided: 100%  Surface Area (cm^2): 23.4  Area Debrided (cm^2): 23.4  Volume (cm^3): 4.68    Tissue and other material debrided: dermis and subcutaneous tissue  Devitalized tissue debrided: fibrin and " "acacia  Instrument(s) utilized: curette  Bleeding: small  Hemostasis obtained with: pressure  Procedural pain (0-10): 4  Post-procedural pain: 2   Response to treatment: procedure was tolerated well               Lab Results   Component Value Date    HGBA1C 5.5 08/18/2023       Wound Instructions:  Orders Placed This Encounter   Procedures   • Wound cleansing and dressings Venous Ulcer Left;Lateral Pretibial     Left lower leg wound     Wash your hands with soap and water.  Remove old dressing, discard into plastic bag and place in trash. Cleanse the wound with soap and water, vashe prior to applying a clean dressing. Do not use tissue or cotton balls. Do not scrub the wound. Pat dry using gauze.  Shower yes       Apply Endoform AM to wound followed by Hydrafera blue  Cover with abd  Secure with adelia and tape, surepress  Change dressing every other day     Surepress     Apply compression wrap to your affected Leg(s) from mid-foot to knee making sure to cover the heel. Apply in the morning and re-wrap as needed during the day if wrap becomes loose. Remove at bedtime and elevate legs or lie down.     Avoid prolonged standing in one place.     Elevate leg(s) above the level of the heart when sitting or as much as possible.     Standing Status:   Future     Standing Expiration Date:   8/8/2024   • Wound Procedure Treatment Venous Ulcer Left;Lateral Pretibial     This order was created via procedure documentation   • Debridement Venous Ulcer Left;Lateral Pretibial     This order was created via procedure documentation             Kodak Romero MD, CHT, CWS    Portions of the record may have been created with voice recognition software. Occasional wrong word or \"sound alike\" substitutions may have occurred due to the inherent limitations of voice recognition software. Read the chart carefully and recognize, using context, where substitutions have occurred.      "

## 2024-08-08 ENCOUNTER — OFFICE VISIT (OUTPATIENT)
Dept: WOUND CARE | Facility: HOSPITAL | Age: 49
End: 2024-08-08
Payer: COMMERCIAL

## 2024-08-08 VITALS
RESPIRATION RATE: 16 BRPM | HEART RATE: 78 BPM | DIASTOLIC BLOOD PRESSURE: 90 MMHG | SYSTOLIC BLOOD PRESSURE: 122 MMHG | TEMPERATURE: 98 F

## 2024-08-08 DIAGNOSIS — L97.222 VENOUS STASIS ULCER OF LEFT CALF WITH FAT LAYER EXPOSED, UNSPECIFIED WHETHER VARICOSE VEINS PRESENT (HCC): Primary | ICD-10-CM

## 2024-08-08 DIAGNOSIS — I83.022 VENOUS STASIS ULCER OF LEFT CALF WITH FAT LAYER EXPOSED, UNSPECIFIED WHETHER VARICOSE VEINS PRESENT (HCC): Primary | ICD-10-CM

## 2024-08-08 PROCEDURE — 99213 OFFICE O/P EST LOW 20 MIN: CPT | Performed by: FAMILY MEDICINE

## 2024-08-08 PROCEDURE — 99212 OFFICE O/P EST SF 10 MIN: CPT | Performed by: FAMILY MEDICINE

## 2024-08-08 RX ORDER — LIDOCAINE HYDROCHLORIDE 40 MG/ML
5 SOLUTION TOPICAL ONCE
Status: COMPLETED | OUTPATIENT
Start: 2024-08-08 | End: 2024-08-08

## 2024-08-08 RX ADMIN — LIDOCAINE HYDROCHLORIDE 5 ML: 40 SOLUTION TOPICAL at 15:50

## 2024-08-08 NOTE — PATIENT INSTRUCTIONS
Orders Placed This Encounter   Procedures    Wound cleansing and dressings Venous Ulcer Left;Lateral Pretibial     Left lower leg wound     Wash your hands with soap and water.  Remove old dressing, discard into plastic bag and place in trash. Cleanse the wound with soap and water, vashe prior to applying a clean dressing. Do not use tissue or cotton balls. Do not scrub the wound. Pat dry using gauze.  Shower yes         Apply Hydrafera blue  Cover with abd  Secure with adelia and tape  Change dressing 2 x week     Standing Status:   Future     Standing Expiration Date:   8/15/2024    Wound cleansing and dressings Venous Ulcer Left;Lateral Pretibial     Compression Stocking 20-30 mmhg    Remove compression stockings every night HS and re-apply first thing qAM. Follow daily skin care as instructed.    Avoid prolonged standing in one place.    Elevate leg(s) above the level of the heart when sitting or as much as possible.     Standing Status:   Future     Standing Expiration Date:   8/15/2024

## 2024-08-08 NOTE — PROGRESS NOTES
Wound Procedure Treatment Venous Ulcer Left;Lateral Pretibial    Performed by: Rekha Frazier RN  Authorized by: Kodak Romero MD    Associated wounds:   Wound 07/25/24 Venous Ulcer Pretibial Left;Lateral  Wound cleansed with:  NSS  Applied primary dressing:  Hydrafera blue ready  Applied secondary dressing:  ABD  Dressing secured with:  Adri and Tape

## 2024-08-08 NOTE — PROGRESS NOTES
Patient ID: Johanny Rodriguez is a 49 y.o. female Date of Birth 1975       Chief Complaint   Patient presents with   • Follow Up Wound Care Visit     Left leg wound. Wearing compression stocking.       Allergies:  Antihistamines, diphenhydramine-type; Lactose - food allergy; and Latex    Diagnosis:      Diagnosis ICD-10-CM Associated Orders   1. Venous stasis ulcer of left calf with fat layer exposed, unspecified whether varicose veins present ([facility])  I83.022 lidocaine (XYLOCAINE) 4 % topical solution 5 mL    L97.222 Wound cleansing and dressings Venous Ulcer Left;Lateral Pretibial     Wound cleansing and dressings Venous Ulcer Left;Lateral Pretibial     Wound Procedure Treatment Venous Ulcer Left;Lateral Pretibial              Assessment & Plan:  Venous stasis ulcer of the left lower extremity.  Overall dimensions are not improved but there is increased epithelization centrally.  Less slough.  Hold endoform for now.  Hydrofera Blue.  20-30 mmHg compression stocking is currently being used.  Change twice a week.         Subjective:   8/8/2024: Follow-up venous stasis ulcer of the left calf.  Once again, she has not received her ordered dressings.  We have not heard from the company nor has the patient.  She did get 20 to 30 mmHg compression stocking and is using that.  Hydrofera Blue and endoform.    8/1/2024: Follow-up venous stasis ulcer of the left calf.  Unfortunately, she did not receive her ordered dressings.  We only found out today that they were not covered.  Using SurePress but does not really like it.  She notes moderate drainage.  No fever or chills.  No increased pain.    7/25/2024: First visit for this 49-year-old female who comes to the wound center because of nonhealing ulceration on her left calf.  Patient has a history of ulceration on the same leg in 2023.  At that time she saw wound care in [place] with eventual healing.  She did have venous Duplex Dopplers with deep veins appearing competent  bilaterally. The greater and lesser saphenous veins appear patent and competent bilaterally. Reflux was detected in both   saphenofemoral junctions.  (2023)  In March of this year, she developed a small ulceration which has increased in size.  Over the last month or 2 there is been possible decrease in size slightly.  She describes a burning sensation.  She does wear 15 to 20 mm compression stocking.  Current treatment has been with a bordered, silver foam.  (Mepilex Ag)   There is no history of diabetes.          The following portions of the patient's history were reviewed and updated as appropriate:   There is no problem list on file for this patient.    Past Medical History:   Diagnosis Date   • Hypercholesterolemia      No past surgical history on file.  Family History   Problem Relation Age of Onset   • Heart disease Father    • Colon cancer Maternal Grandmother       Social History     Socioeconomic History   • Marital status: Single     Spouse name: Not on file   • Number of children: Not on file   • Years of education: Not on file   • Highest education level: Not on file   Occupational History   • Not on file   Tobacco Use   • Smoking status: Never   • Smokeless tobacco: Not on file   Vaping Use   • Vaping status: Never Used   Substance and Sexual Activity   • Alcohol use: Not on file   • Drug use: Never   • Sexual activity: Not on file   Other Topics Concern   • Not on file   Social History Narrative   • Not on file     Social Determinants of Health     Financial Resource Strain: Not on file   Food Insecurity: Not on file   Transportation Needs: Not on file   Physical Activity: Not on file   Stress: Not on file   Social Connections: Not on file   Intimate Partner Violence: Not on file   Housing Stability: Not on file        Current Outpatient Medications:   •  cetirizine (ZyrTEC Allergy) 10 mg tablet, Take 10 mg by mouth daily, Disp: , Rfl:   •  desvenlafaxine (PRISTIQ) 100 mg 24 hr tablet, Take 100 mg by  mouth daily, Disp: , Rfl:   •  LaMICtal 25 MG tablet, Take 25 mg by mouth daily, Disp: , Rfl:   •  montelukast (SINGULAIR) 10 mg tablet, Take 10 mg by mouth daily at bedtime, Disp: , Rfl:   •  Multiple Vitamins-Minerals (MULTIVITAMIN ADULTS 50+ PO), Multivitamin, Disp: , Rfl:   •  simvastatin (ZOCOR) 20 mg tablet, Take 20 mg by mouth daily, Disp: , Rfl:   •  topiramate (TOPAMAX) 50 MG tablet, Take 50 mg by mouth daily, Disp: , Rfl:   •  Topiramate ER 50 MG CP24, Take 50 mg by mouth daily, Disp: , Rfl:   •  Triamcinolone Acetonide (Nasacort Allergy) 55 MCG/ACT nasal spray, 2 sprays into each nostril daily, Disp: , Rfl:   No current facility-administered medications for this visit.    Review of Systems   Constitutional:  Negative for appetite change, chills, fatigue, fever and unexpected weight change.   HENT:  Negative for congestion, hearing loss and postnasal drip.    Respiratory:  Negative for cough and shortness of breath.    Cardiovascular:  Positive for leg swelling (Improved and less swelling at ankle).   Musculoskeletal:  Negative for gait problem.   Skin:  Positive for wound (Left calf). Negative for rash.   Neurological:  Negative for numbness.   Hematological:  Does not bruise/bleed easily.   Psychiatric/Behavioral:  Positive for dysphoric mood. The patient is not nervous/anxious.        Objective:  /90   Pulse 78   Temp 98 °F (36.7 °C)   Resp 16   Pain Score:   4     Physical Exam  Vitals and nursing note reviewed.   Constitutional:       Appearance: Normal appearance. She is well-developed.   HENT:      Head: Normocephalic and atraumatic.   Cardiovascular:      Rate and Rhythm: Normal rate.   Pulmonary:      Effort: Pulmonary effort is normal.   Musculoskeletal:      Left lower le+ Edema present.   Skin:     General: Skin is warm and dry.      Findings: Wound present. No erythema.             Comments: Irregular ulceration with areas of slough but less than previous.  Increased  epithelization centrally.  Overall dimensions slightly larger.  Moderate serosanguineous drainage.  No malodor and no signs of infection.  Dusky discoloration lateral to the ulcer.  Areas of hyperpigmentation from previous ulcers.   Neurological:      Mental Status: She is alert and oriented to person, place, and time.   Psychiatric:         Attention and Perception: Attention normal.         Mood and Affect: Mood and affect normal.         Behavior: Behavior is cooperative.         Cognition and Memory: Cognition normal.         Wound 07/25/24 Venous Ulcer Pretibial Left;Lateral (Active)   Wound Image Images linked 08/08/24 1548   Wound Description Epithelialization;Granulation tissue;Yellow;Hypergranulation 08/08/24 1548   Alona-wound Assessment Edema;Hyperpigmented 08/08/24 1548   Wound Length (cm) 6.5 cm 08/08/24 1548   Wound Width (cm) 4 cm 08/08/24 1548   Wound Depth (cm) 0.1 cm 08/08/24 1548   Wound Surface Area (cm^2) 26 cm^2 08/08/24 1548   Wound Volume (cm^3) 2.6 cm^3 08/08/24 1548   Calculated Wound Volume (cm^3) 2.6 cm^3 08/08/24 1548   Change in Wound Size % 22.62 08/08/24 1548   Drainage Amount Moderate 08/08/24 1548   Drainage Description Serosanguineous 08/08/24 1548   Non-staged Wound Description Full thickness 08/08/24 1548   Dressing Status Intact 08/08/24 1548                  Lab Results   Component Value Date    HGBA1C 5.5 08/18/2023       Wound Instructions:  Orders Placed This Encounter   Procedures   • Wound cleansing and dressings Venous Ulcer Left;Lateral Pretibial     Left lower leg wound     Wash your hands with soap and water.  Remove old dressing, discard into plastic bag and place in trash. Cleanse the wound with soap and water, vashe prior to applying a clean dressing. Do not use tissue or cotton balls. Do not scrub the wound. Pat dry using gauze.  Shower yes         Apply Hydrafera blue  Cover with abd  Secure with adelia and tape  Change dressing 2 x week     Standing Status:    "Future     Standing Expiration Date:   8/15/2024   • Wound cleansing and dressings Venous Ulcer Left;Lateral Pretibial     Compression Stocking 20-30 mmhg    Remove compression stockings every night HS and re-apply first thing qAM. Follow daily skin care as instructed.    Avoid prolonged standing in one place.    Elevate leg(s) above the level of the heart when sitting or as much as possible.     Standing Status:   Future     Standing Expiration Date:   8/15/2024   • Wound Procedure Treatment Venous Ulcer Left;Lateral Pretibial     This order was created via procedure documentation               Kodak Romero MD, CHT, CWS    Portions of the record may have been created with voice recognition software. Occasional wrong word or \"sound alike\" substitutions may have occurred due to the inherent limitations of voice recognition software. Read the chart carefully and recognize, using context, where substitutions have occurred.    "

## 2024-08-15 ENCOUNTER — OFFICE VISIT (OUTPATIENT)
Dept: WOUND CARE | Facility: HOSPITAL | Age: 49
End: 2024-08-15
Payer: COMMERCIAL

## 2024-08-15 VITALS — TEMPERATURE: 96.9 F | DIASTOLIC BLOOD PRESSURE: 88 MMHG | SYSTOLIC BLOOD PRESSURE: 124 MMHG | RESPIRATION RATE: 18 BRPM

## 2024-08-15 DIAGNOSIS — I83.022 VENOUS STASIS ULCER OF LEFT CALF WITH FAT LAYER EXPOSED, UNSPECIFIED WHETHER VARICOSE VEINS PRESENT (HCC): Primary | ICD-10-CM

## 2024-08-15 DIAGNOSIS — L97.222 VENOUS STASIS ULCER OF LEFT CALF WITH FAT LAYER EXPOSED, UNSPECIFIED WHETHER VARICOSE VEINS PRESENT (HCC): Primary | ICD-10-CM

## 2024-08-15 PROCEDURE — 97597 DBRDMT OPN WND 1ST 20 CM/<: CPT | Performed by: FAMILY MEDICINE

## 2024-08-15 PROCEDURE — 97598 DBRDMT OPN WND ADDL 20CM/<: CPT | Performed by: FAMILY MEDICINE

## 2024-08-15 RX ORDER — LIDOCAINE 40 MG/G
CREAM TOPICAL ONCE
Status: COMPLETED | OUTPATIENT
Start: 2024-08-15 | End: 2024-08-15

## 2024-08-15 RX ADMIN — LIDOCAINE: 40 CREAM TOPICAL at 16:32

## 2024-08-15 NOTE — PROGRESS NOTES
Patient ID: Johanny Rodriguez is a 49 y.o. female Date of Birth 1975       Chief Complaint   Patient presents with   • Follow Up Wound Care Visit     Left leg wound       Allergies:  Antihistamines, diphenhydramine-type; Lactose - food allergy; and Latex    Diagnosis:      Diagnosis ICD-10-CM Associated Orders   1. Venous stasis ulcer of left calf with fat layer exposed, unspecified whether varicose veins present ([facility])  I83.022 lidocaine (LMX) 4 % cream    L97.222 Wound cleansing and dressings Venous Ulcer Left;Lateral Pretibial     Wound cleansing and dressings Venous Ulcer Left;Lateral Pretibial     Wound Procedure Treatment Venous Ulcer Left;Lateral Pretibial     Ambulatory referral to Vascular Surgery     Debridement Venous Ulcer Left;Lateral Pretibial              Assessment & Plan:  Venous stasis ulcer of the left calf improving.  Endoform or Julia followed by Hydrofera Blue (Mepilex border today) change 3 times a week.  If Hydrofera Blue is sticking, change twice a week.  Compression stocking.  Referral to vascular surgery.  This is due to recurring venous ulcers in the left calf over the years.         Subjective:   8/15/2024: Follow-up venous stasis ulcer of the left calf.  She still has not received her order.  She does have some Hydrofera Blue and endoform.  The Hydrofera Blue is been sticking.  She is wearing her compression stocking.    8/8/2024: Follow-up venous stasis ulcer of the left calf.  Once again, she has not received her ordered dressings.  We have not heard from the company nor has the patient.  She did get 20 to 30 mmHg compression stocking and is using that.  Hydrofera Blue and endoform.    8/1/2024: Follow-up venous stasis ulcer of the left calf.  Unfortunately, she did not receive her ordered dressings.  We only found out today that they were not covered.  Using SurePress but does not really like it.  She notes moderate drainage.  No fever or chills.  No increased pain.    7/25/2024:  First visit for this 49-year-old female who comes to the wound center because of nonhealing ulceration on her left calf.  Patient has a history of ulceration on the same leg in 2023.  At that time she saw wound care in [place] with eventual healing.  She did have venous Duplex Dopplers with deep veins appearing competent bilaterally. The greater and lesser saphenous veins appear patent and competent bilaterally. Reflux was detected in both   saphenofemoral junctions.  (2023)  In March of this year, she developed a small ulceration which has increased in size.  Over the last month or 2 there is been possible decrease in size slightly.  She describes a burning sensation.  She does wear 15 to 20 mm compression stocking.  Current treatment has been with a bordered, silver foam.  (Mepilex Ag)   There is no history of diabetes.          The following portions of the patient's history were reviewed and updated as appropriate:   There is no problem list on file for this patient.    Past Medical History:   Diagnosis Date   • Hypercholesterolemia      No past surgical history on file.  Family History   Problem Relation Age of Onset   • Heart disease Father    • Colon cancer Maternal Grandmother       Social History     Socioeconomic History   • Marital status: Single     Spouse name: Not on file   • Number of children: Not on file   • Years of education: Not on file   • Highest education level: Not on file   Occupational History   • Not on file   Tobacco Use   • Smoking status: Never   • Smokeless tobacco: Not on file   Vaping Use   • Vaping status: Never Used   Substance and Sexual Activity   • Alcohol use: Not on file   • Drug use: Never   • Sexual activity: Not on file   Other Topics Concern   • Not on file   Social History Narrative   • Not on file     Social Determinants of Health     Financial Resource Strain: Not on file   Food Insecurity: Not on file   Transportation Needs: Not on file   Physical Activity: Not on  file   Stress: Not on file   Social Connections: Not on file   Intimate Partner Violence: Not on file   Housing Stability: Not on file        Current Outpatient Medications:   •  cetirizine (ZyrTEC Allergy) 10 mg tablet, Take 10 mg by mouth daily, Disp: , Rfl:   •  desvenlafaxine (PRISTIQ) 100 mg 24 hr tablet, Take 100 mg by mouth daily, Disp: , Rfl:   •  LaMICtal 25 MG tablet, Take 25 mg by mouth daily, Disp: , Rfl:   •  montelukast (SINGULAIR) 10 mg tablet, Take 10 mg by mouth daily at bedtime, Disp: , Rfl:   •  Multiple Vitamins-Minerals (MULTIVITAMIN ADULTS 50+ PO), Multivitamin, Disp: , Rfl:   •  simvastatin (ZOCOR) 20 mg tablet, Take 20 mg by mouth daily, Disp: , Rfl:   •  topiramate (TOPAMAX) 50 MG tablet, Take 50 mg by mouth daily, Disp: , Rfl:   •  Topiramate ER 50 MG CP24, Take 50 mg by mouth daily, Disp: , Rfl:   •  Triamcinolone Acetonide (Nasacort Allergy) 55 MCG/ACT nasal spray, 2 sprays into each nostril daily, Disp: , Rfl:   No current facility-administered medications for this visit.    Review of Systems   Constitutional:  Negative for chills and fever.   HENT:  Negative for congestion and hearing loss.    Respiratory:  Negative for cough.    Cardiovascular:  Positive for leg swelling (Improved and less swelling at ankle).   Musculoskeletal:  Negative for gait problem.   Skin:  Positive for wound (Left calf). Negative for rash.   Neurological:  Negative for numbness.   Hematological:  Does not bruise/bleed easily.   Psychiatric/Behavioral:  Positive for dysphoric mood. The patient is not nervous/anxious.        Objective:  /88   Temp (!) 96.9 °F (36.1 °C)   Resp 18         Physical Exam  Vitals and nursing note reviewed.   Constitutional:       Appearance: Normal appearance. She is well-developed.   HENT:      Head: Normocephalic and atraumatic.   Cardiovascular:      Rate and Rhythm: Normal rate.   Pulmonary:      Effort: Pulmonary effort is normal.   Musculoskeletal:      Left lower leg:  "1+ Edema present.   Skin:     General: Skin is warm and dry.      Findings: Wound present. No erythema.             Comments: Irregular ulceration with areas of slough but less than previous.  Increased epithelization centrally.  Overall dimensions unchanged.  Moderate serosanguineous drainage.  No malodor and no signs of infection.  Dusky discoloration lateral to the ulcer.  Areas of hyperpigmentation from previous ulcers.   Neurological:      Mental Status: She is alert and oriented to person, place, and time.   Psychiatric:         Attention and Perception: Attention normal.         Mood and Affect: Mood and affect normal.         Behavior: Behavior is cooperative.         Cognition and Memory: Cognition normal.         Wound 07/25/24 Venous Ulcer Pretibial Left;Lateral (Active)   Wound Image Images linked 08/15/24 1604   Wound Description Epithelialization;Yellow;Granulation tissue;Slough 08/15/24 1604   Alona-wound Assessment Edema;Hyperpigmented 08/15/24 1604   Wound Length (cm) 6.2 cm 08/15/24 1604   Wound Width (cm) 4 cm 08/15/24 1604   Wound Depth (cm) 0.1 cm 08/15/24 1604   Wound Surface Area (cm^2) 24.8 cm^2 08/15/24 1604   Wound Volume (cm^3) 2.48 cm^3 08/15/24 1604   Calculated Wound Volume (cm^3) 2.48 cm^3 08/15/24 1604   Change in Wound Size % 26.19 08/15/24 1604   Drainage Amount Moderate 08/15/24 1604   Drainage Description Serosanguineous 08/15/24 1604   Non-staged Wound Description Full thickness 08/15/24 1604   Dressing Status Intact 08/15/24 1604        Debridement   Wound 07/25/24 Venous Ulcer Pretibial Left;Lateral    Universal Protocol:  procedure performed by consultantConsent: Verbal consent obtained. Written consent obtained.  Consent given by: patient  Time out: Immediately prior to procedure a \"time out\" was called to verify the correct patient, procedure, equipment, support staff and site/side marked as required.  Patient understanding: patient states understanding of the procedure being " performed  Patient identity confirmed: verbally with patient    Debridement Details  Performed by: physician  Debridement type: selective  Pain control: lidocaine 4%      Post-debridement measurements  Length (cm): 6.2  Width (cm): 4  Depth (cm): 0.1  Percent debrided: 100%  Surface Area (cm^2): 24.8  Area Debrided (cm^2): 24.8  Volume (cm^3): 2.48    Tissue and other material debrided: dermis  Devitalized tissue debrided: fibrin and slough  Instrument(s) utilized: curette  Bleeding: small  Hemostasis obtained with: pressure  Procedural pain (0-10): 0  Post-procedural pain: 0   Response to treatment: procedure was tolerated well               Lab Results   Component Value Date    HGBA1C 5.5 08/18/2023       Wound Instructions:  Orders Placed This Encounter   Procedures   • Wound cleansing and dressings Venous Ulcer Left;Lateral Pretibial     Left lower leg wound     Wash your hands with soap and water.  Remove old dressing, discard into plastic bag and place in trash. Cleanse the wound with soap and water, vashe prior to applying a clean dressing. Do not use tissue or cotton balls. Do not scrub the wound. Pat dry using gauze.  Shower yes         Apply collagen (either puracol or Endoform) then apply Hydrafera blue  Cover with abd  Secure with adelia and tape  Change dressing 2-3 x week (If Hydrofera is sticking try changing the dressing less frequently)     Standing Status:   Future     Standing Expiration Date:   8/22/2024   • Wound cleansing and dressings Venous Ulcer Left;Lateral Pretibial     Compression Stocking 20-30 mmhg    Remove compression stockings every night HS and re-apply first thing qAM. Follow daily skin care as instructed.    Avoid prolonged standing in one place.    Elevate leg(s) above the level of the heart when sitting or as much as possible.     Standing Status:   Future     Standing Expiration Date:   8/22/2024   • Wound Procedure Treatment Venous Ulcer Left;Lateral Pretibial     This order  "was created via procedure documentation   • Debridement Venous Ulcer Left;Lateral Pretibial     This order was created via procedure documentation   • Ambulatory referral to Vascular Surgery     Standing Status:   Future     Standing Expiration Date:   8/15/2025     Referral Priority:   Routine     Referral Type:   Consult - AMB     Referral Reason:   Specialty Services Required     Requested Specialty:   Vascular Surgery     Number of Visits Requested:   1     Expiration Date:   8/15/2025             Kodak Romero MD, CHT, CWS    Portions of the record may have been created with voice recognition software. Occasional wrong word or \"sound alike\" substitutions may have occurred due to the inherent limitations of voice recognition software. Read the chart carefully and recognize, using context, where substitutions have occurred.      "

## 2024-08-15 NOTE — PATIENT INSTRUCTIONS
Orders Placed This Encounter   Procedures    Wound cleansing and dressings Venous Ulcer Left;Lateral Pretibial     Left lower leg wound     Wash your hands with soap and water.  Remove old dressing, discard into plastic bag and place in trash. Cleanse the wound with soap and water, vashe prior to applying a clean dressing. Do not use tissue or cotton balls. Do not scrub the wound. Pat dry using gauze.  Shower yes         Apply collagen (either puracol or Endoform) then apply Hydrafera blue  Cover with abd  Secure with adelia and tape  Change dressing 2-3 x week (If Hydrofera is sticking try changing the dressing less frequently)     Standing Status:   Future     Standing Expiration Date:   8/22/2024    Wound cleansing and dressings Venous Ulcer Left;Lateral Pretibial     Compression Stocking 20-30 mmhg    Remove compression stockings every night HS and re-apply first thing qAM. Follow daily skin care as instructed.    Avoid prolonged standing in one place.    Elevate leg(s) above the level of the heart when sitting or as much as possible.     Standing Status:   Future     Standing Expiration Date:   8/22/2024    Wound Procedure Treatment Venous Ulcer Left;Lateral Pretibial     This order was created via procedure documentation    Ambulatory referral to Vascular Surgery     Standing Status:   Future     Standing Expiration Date:   8/15/2025     Referral Priority:   Routine     Referral Type:   Consult - AMB     Referral Reason:   Specialty Services Required     Requested Specialty:   Vascular Surgery     Number of Visits Requested:   1     Expiration Date:   8/15/2025

## 2024-08-15 NOTE — PROGRESS NOTES
Wound Procedure Treatment Venous Ulcer Left;Lateral Pretibial    Performed by: Rekha Frazier RN  Authorized by: Kodak Romero MD    Associated wounds:   Wound 07/25/24 Venous Ulcer Pretibial Left;Lateral  Wound cleansed with:  Soap and water  Applied primary dressing:  Collagen dressing, Silver and Silicone bordered foam

## 2024-08-19 ENCOUNTER — TELEPHONE (OUTPATIENT)
Dept: WOUND CARE | Facility: HOSPITAL | Age: 49
End: 2024-08-19

## 2024-08-19 NOTE — TELEPHONE ENCOUNTER
Spoke with Kedar at [facility] to clarify supply orders. [facility] does not cover Hydrofera Blue and can substitute with DermaBlue. Requested order get rushed since patient has been waiting for supplies. Kedar states this order still needs to be approved on their end and then will be shipped out in 3 days if approved.

## 2024-08-22 ENCOUNTER — OFFICE VISIT (OUTPATIENT)
Dept: WOUND CARE | Facility: HOSPITAL | Age: 49
End: 2024-08-22
Payer: COMMERCIAL

## 2024-08-22 VITALS
RESPIRATION RATE: 16 BRPM | DIASTOLIC BLOOD PRESSURE: 70 MMHG | HEART RATE: 72 BPM | TEMPERATURE: 96.6 F | SYSTOLIC BLOOD PRESSURE: 110 MMHG

## 2024-08-22 DIAGNOSIS — L97.222 VENOUS STASIS ULCER OF LEFT CALF WITH FAT LAYER EXPOSED, UNSPECIFIED WHETHER VARICOSE VEINS PRESENT (HCC): Primary | ICD-10-CM

## 2024-08-22 DIAGNOSIS — I83.022 VENOUS STASIS ULCER OF LEFT CALF WITH FAT LAYER EXPOSED, UNSPECIFIED WHETHER VARICOSE VEINS PRESENT (HCC): Primary | ICD-10-CM

## 2024-08-22 PROCEDURE — 97597 DBRDMT OPN WND 1ST 20 CM/<: CPT

## 2024-08-22 RX ORDER — LIDOCAINE HYDROCHLORIDE 40 MG/ML
5 SOLUTION TOPICAL ONCE
Status: COMPLETED | OUTPATIENT
Start: 2024-08-22 | End: 2024-08-22

## 2024-08-22 RX ADMIN — LIDOCAINE HYDROCHLORIDE 5 ML: 40 SOLUTION TOPICAL at 15:39

## 2024-08-22 NOTE — PROGRESS NOTES
Patient ID: Johanny Rodriguez is a 49 y.o. female Date of Birth 1975       Chief Complaint   Patient presents with    Follow Up Wound Care Visit     Left leg ulcer       Allergies:  Antihistamines, diphenhydramine-type; Lactose - food allergy; and Latex    Diagnosis:      Diagnosis ICD-10-CM Associated Orders   1. Venous stasis ulcer of left calf with fat layer exposed, unspecified whether varicose veins present ([facility])  I83.022 lidocaine (XYLOCAINE) 4 % topical solution 5 mL    L97.222 Wound cleansing and dressings Venous Ulcer Left;Lateral Pretibial     Wound cleansing and dressings Venous Ulcer Left;Lateral Pretibial     Wound Procedure Treatment Venous Ulcer Left;Lateral Pretibial     Debridement Venous Ulcer Left;Lateral Pretibial              Assessment & Plan:  Left lower extremity full-thickness venous ulcer.  Wound is measuring slightly smaller on exam today with noted increased epithelialization within the measured wound bed.  Will recommend to continue with current wound management of endoform and Hydrofera Blue.  Continue with patient's personal compression garment.   Wound is not showing any s/s of clinical infection.   Debrided as below.   Elevate lower extremities and avoid prolonged standing for edema management.   Follow-up in 2 weeks. Call sooner with questions or concerns or any signs of infection such as redness, swelling, increased/purulent drainage, fever or chills, and increased pain.  Patient verbalized understanding and agrees with plan of care.           Subjective:   8/22/24: Patient presents to wound healing center for follow-up visit of left lower extremity venous ulcer.  Patient has been using endoform and Hydrofera Blue to the wound.  Patient states that she has had issues with obtaining wound care supplies through her vendor however this has been rectified and she has received her supplies.  Patient wears her own compression and is tolerating it well.  Patient offers no wound related  complaints, denies increased pain or drainage.  Denies fever or chills.        The following portions of the patient's history were reviewed and updated as appropriate:   There is no problem list on file for this patient.    Past Medical History:   Diagnosis Date    Hypercholesterolemia      No past surgical history on file.  Family History   Problem Relation Age of Onset    Heart disease Father     Colon cancer Maternal Grandmother       Social History     Socioeconomic History    Marital status: Single     Spouse name: Not on file    Number of children: Not on file    Years of education: Not on file    Highest education level: Not on file   Occupational History    Not on file   Tobacco Use    Smoking status: Never    Smokeless tobacco: Not on file   Vaping Use    Vaping status: Never Used   Substance and Sexual Activity    Alcohol use: Not on file    Drug use: Never    Sexual activity: Not on file   Other Topics Concern    Not on file   Social History Narrative    Not on file     Social Determinants of Health     Financial Resource Strain: Not on file   Food Insecurity: Not on file   Transportation Needs: Not on file   Physical Activity: Not on file   Stress: Not on file   Social Connections: Not on file   Intimate Partner Violence: Not on file   Housing Stability: Not on file        Current Outpatient Medications:     cetirizine (ZyrTEC Allergy) 10 mg tablet, Take 10 mg by mouth daily, Disp: , Rfl:     desvenlafaxine (PRISTIQ) 100 mg 24 hr tablet, Take 100 mg by mouth daily, Disp: , Rfl:     LaMICtal 25 MG tablet, Take 25 mg by mouth daily, Disp: , Rfl:     montelukast (SINGULAIR) 10 mg tablet, Take 10 mg by mouth daily at bedtime, Disp: , Rfl:     Multiple Vitamins-Minerals (MULTIVITAMIN ADULTS 50+ PO), Multivitamin, Disp: , Rfl:     simvastatin (ZOCOR) 20 mg tablet, Take 20 mg by mouth daily, Disp: , Rfl:     topiramate (TOPAMAX) 50 MG tablet, Take 50 mg by mouth daily, Disp: , Rfl:     Topiramate ER 50 MG  CP24, Take 50 mg by mouth daily, Disp: , Rfl:     Triamcinolone Acetonide (Nasacort Allergy) 55 MCG/ACT nasal spray, 2 sprays into each nostril daily, Disp: , Rfl:   No current facility-administered medications for this visit.    Review of Systems   Constitutional:  Negative for chills and fever.   HENT:  Negative for congestion and sneezing.    Respiratory:  Negative for cough and shortness of breath.    Cardiovascular:  Positive for leg swelling.   Skin:  Positive for wound.        LLE   Psychiatric/Behavioral:  Negative for agitation.        Objective:  /70   Pulse 72   Temp (!) 96.6 °F (35.9 °C)   Resp 16   Pain Score:   3     Physical Exam  Constitutional:       General: She is awake. She is not in acute distress.     Appearance: She is obese. She is not ill-appearing, toxic-appearing or diaphoretic.   HENT:      Head: Normocephalic and atraumatic.      Right Ear: External ear normal.      Left Ear: External ear normal.   Eyes:      Conjunctiva/sclera: Conjunctivae normal.   Pulmonary:      Effort: Pulmonary effort is normal. No respiratory distress.   Musculoskeletal:      Left lower le+ Edema present.   Skin:     General: Skin is warm and dry.      Findings: Wound present.      Comments: Left lower extremity venous ulcer.  Wound with mixed granular tissue and yellow slough.  Noted areas of epithelialization within the measured wound area.  Wound is full-thickness.  Moderate drainage.  Periwound is intact with blanchable pink/hyperpigmented skin and scar tissue.  There is no erythema, warmth or lymphangitic streaking to suggest cellulitis.   Neurological:      Mental Status: She is alert and oriented to person, place, and time.      Gait: Gait normal.   Psychiatric:         Mood and Affect: Mood normal.         Behavior: Behavior normal. Behavior is cooperative.         Wound 24 Venous Ulcer Pretibial Left;Lateral (Active)   Wound Image   24 [id number]   Wound Description Granulation  "tissue;Epithelialization;Yellow 08/22/24 1537   Alona-wound Assessment Erythema;Hyperpigmented 08/22/24 1537   Wound Length (cm) 6 cm 08/22/24 1537   Wound Width (cm) 4 cm 08/22/24 1537   Wound Depth (cm) 0.2 cm 08/22/24 1537   Wound Surface Area (cm^2) 24 cm^2 08/22/24 1537   Wound Volume (cm^3) 4.8 cm^3 08/22/24 1537   Calculated Wound Volume (cm^3) 4.8 cm^3 08/22/24 1537   Change in Wound Size % -42.86 08/22/24 1537   Drainage Amount Moderate 08/22/24 1537   Drainage Description Serosanguineous 08/22/24 1537   Non-staged Wound Description Full thickness 08/22/24 1537   Dressing Status Intact 08/22/24 1537         Debridement   Wound 07/25/24 Venous Ulcer Pretibial Left;Lateral    Universal Protocol:  Consent: Verbal consent obtained.  Risks and benefits: risks, benefits and alternatives were discussed  Consent given by: patient  Time out: Immediately prior to procedure a \"time out\" was called to verify the correct patient, procedure, equipment, support staff and site/side marked as required.  Timeout called at: 8/22/2024 3:30 PM.  Patient understanding: patient states understanding of the procedure being performed  Patient identity confirmed: verbally with patient    Debridement Details  Performed by: NP  Debridement type: selective  Pain control: lidocaine 4%      Post-debridement measurements  Length (cm): 6  Width (cm): 4  Depth (cm): 0.2  Percent debrided: 50%  Surface Area (cm^2): 24  Area Debrided (cm^2): 12  Volume (cm^3): 4.8    Devitalized tissue debrided: biofilm, exudate, fibrin and slough  Instrument(s) utilized: curette  Bleeding: small  Hemostasis obtained with: pressure  Procedural pain (0-10): 1  Post-procedural pain: 0   Response to treatment: procedure was tolerated well               Lab Results   Component Value Date    HGBA1C 5.5 08/18/2023       Wound Instructions:  Orders Placed This Encounter   Procedures    Wound cleansing and dressings Venous Ulcer Left;Lateral Pretibial     Left lower " "leg wound     Wash your hands with soap and water.  Remove old dressing, discard into plastic bag and place in trash. Cleanse the wound with soap and water, vashe prior to applying a clean dressing. Do not use tissue or cotton balls. Do not scrub the wound. Pat dry using gauze.  Shower yes   Apply collagen (either puracol or Endoform) then apply Hydrafera blue  Cover with abd  Secure with adelia and tape  Change dressing 2-3 x week (If Hydrafera is sticking try changing the dressing less frequently)    Schedule with vascular surgery     Standing Status:   Future     Standing Expiration Date:   9/5/2024    Wound cleansing and dressings Venous Ulcer Left;Lateral Pretibial     Compression Stocking 20-30 mmhg     Remove compression stockings every night HS and re-apply first thing qAM. Follow daily skin care as instructed.     Avoid prolonged standing in one place.     Elevate leg(s) above the level of the heart when sitting or as much as possible.     Standing Status:   Future     Standing Expiration Date:   9/5/2024    Wound Procedure Treatment Venous Ulcer Left;Lateral Pretibial     This order was created via procedure documentation    Debridement Venous Ulcer Left;Lateral Pretibial     This order was created via procedure documentation           MAYE Edouard, JOHN-CHULA, ZENON    Portions of the record may have been created with voice recognition software. Occasional wrong word or \"sound alike\" substitutions may have occurred due to the inherent limitations of voice recognition software. Read the chart carefully and recognize, using context, where substitutions have occurred.          Total time spent today:    Total time (face-to-face and non-face-to-face) spent on today's visit was 20 minutes. This includes preparation for the visits (i.e. reviewing test results from date recent hospitalizations, ER/Urgent Care/primary care visits and recent consultant office visits), performance of a medically appropriate " history and examination, and orders for medications or testing.

## 2024-08-22 NOTE — PATIENT INSTRUCTIONS
Orders Placed This Encounter   Procedures    Wound cleansing and dressings Venous Ulcer Left;Lateral Pretibial     Left lower leg wound     Wash your hands with soap and water.  Remove old dressing, discard into plastic bag and place in trash. Cleanse the wound with soap and water, vashe prior to applying a clean dressing. Do not use tissue or cotton balls. Do not scrub the wound. Pat dry using gauze.  Shower yes   Apply collagen (either puracol or Endoform) then apply Hydrafera blue  Cover with abd  Secure with adelia and tape  Change dressing 2-3 x week (If Hydrafera is sticking try changing the dressing less frequently)    Schedule with vascular surgery     Standing Status:   Future     Standing Expiration Date:   9/5/2024    Wound cleansing and dressings Venous Ulcer Left;Lateral Pretibial     Compression Stocking 20-30 mmhg     Remove compression stockings every night HS and re-apply first thing qAM. Follow daily skin care as instructed.     Avoid prolonged standing in one place.     Elevate leg(s) above the level of the heart when sitting or as much as possible.     Standing Status:   Future     Standing Expiration Date:   9/5/2024

## 2024-08-22 NOTE — PROGRESS NOTES
Wound Procedure Treatment Venous Ulcer Left;Lateral Pretibial    Performed by: Rekha Frazier RN  Authorized by: MAYE Skelton    Associated wounds:   Wound 07/25/24 Venous Ulcer Pretibial Left;Lateral  Wound cleansed with:  Soap and water  Applied primary dressing:  Silver, Collagen dressing and Hydrafera blue ready  Dressing secured with:  Kerlix and Tape

## 2024-09-05 ENCOUNTER — OFFICE VISIT (OUTPATIENT)
Dept: WOUND CARE | Facility: HOSPITAL | Age: 49
End: 2024-09-05
Payer: COMMERCIAL

## 2024-09-05 VITALS
TEMPERATURE: 96 F | RESPIRATION RATE: 16 BRPM | HEART RATE: 62 BPM | SYSTOLIC BLOOD PRESSURE: 130 MMHG | DIASTOLIC BLOOD PRESSURE: 80 MMHG

## 2024-09-05 DIAGNOSIS — L97.222 VENOUS STASIS ULCER OF LEFT CALF WITH FAT LAYER EXPOSED, UNSPECIFIED WHETHER VARICOSE VEINS PRESENT (HCC): Primary | ICD-10-CM

## 2024-09-05 DIAGNOSIS — I83.022 VENOUS STASIS ULCER OF LEFT CALF WITH FAT LAYER EXPOSED, UNSPECIFIED WHETHER VARICOSE VEINS PRESENT (HCC): Primary | ICD-10-CM

## 2024-09-05 PROCEDURE — 11042 DBRDMT SUBQ TIS 1ST 20SQCM/<: CPT | Performed by: FAMILY MEDICINE

## 2024-09-05 RX ORDER — LIDOCAINE HYDROCHLORIDE 40 MG/ML
5 SOLUTION TOPICAL ONCE
Status: COMPLETED | OUTPATIENT
Start: 2024-09-05 | End: 2024-09-05

## 2024-09-05 RX ADMIN — LIDOCAINE HYDROCHLORIDE 5 ML: 40 SOLUTION TOPICAL at 15:54

## 2024-09-05 NOTE — PATIENT INSTRUCTIONS
Orders Placed This Encounter   Procedures    Wound cleansing and dressings Venous Ulcer Left;Lateral Pretibial     Orders Placed This Encounter  Procedures  · Wound cleansing and dressings Venous Ulcer Left;Lateral Pretibial      Left lower leg wound     Wash your hands with soap and water.  Remove old dressing, discard into plastic bag and place in trash. Cleanse the wound with soap and water, vashe prior to applying a clean dressing. Do not use tissue or cotton balls. Do not scrub the wound. Pat dry using gauze.  Shower yes   Apply collagen (either puracol or Endoform) then apply Hydrafera blue  Cover with abd  Secure with adelia and tape  Change dressing 2-3 x week (If Hydrafera is sticking try changing the dressing less frequently)     Schedule with vascular surgery      Standing Status:   Future      Standing Expiration Date:   9/5/2024  · Wound cleansing and dressings Venous Ulcer Left;Lateral Pretibial      Compression Stocking 20-30 mmhg     Remove compression stockings every night HS and re-apply first thing qAM. Follow daily skin care as instructed.     Avoid prolonged standing in one place.     Elevate leg(s) above the level of the heart when sitting or as much as possible.     Standing Status:   Future     Standing Expiration Date:   9/12/2024

## 2024-09-05 NOTE — PROGRESS NOTES
Wound Procedure Treatment Venous Ulcer Left;Lateral Pretibial    Performed by: Chelle Tejada RN  Authorized by: Kodak Romero MD    Associated wounds:   Wound 07/25/24 Venous Ulcer Pretibial Left;Lateral  Wound cleansed with:  Soap and water  Applied primary dressing:  Hydrafera blue ready  Applied secondary dressing:  ABD  Dressing secured with:  Adri and Tape  Comments:  Compression stocking

## 2024-09-05 NOTE — PROGRESS NOTES
Patient ID: Johanny Rodriguez is a 49 y.o. female Date of Birth 1975       Chief Complaint   Patient presents with   • Follow Up Wound Care Visit     Left lower leg wound.       Allergies:  Antihistamines, diphenhydramine-type; Lactose - food allergy; and Latex    Diagnosis:      Diagnosis ICD-10-CM Associated Orders   1. Venous stasis ulcer of left calf with fat layer exposed, unspecified whether varicose veins present ([facility])  I83.022 lidocaine (XYLOCAINE) 4 % topical solution 5 mL    L97.222 Wound cleansing and dressings Venous Ulcer Left;Lateral Pretibial     Wound Procedure Treatment Venous Ulcer Left;Lateral Pretibial     Debridement Venous Ulcer Left;Lateral Pretibial              Assessment & Plan:  Slowly improving venous stasis ulcer of the left calf.  Surgical debridement.  Endoform, Hydrofera Blue and compression stocking.  Follow-up in about 2 weeks.         Subjective:   9/5/2024: Follow-up venous stasis ulcer of the left lateral calf.  Has some discomfort but not worse than previous.  Endoform and Hydrofera Blue being used.  No fever or chills.    8/22/24: Patient presents to wound healing center for follow-up visit of left lower extremity venous ulcer.  Patient has been using endoform and Hydrofera Blue to the wound.  Patient states that she has had issues with obtaining wound care supplies through her vendor however this has been rectified and she has received her supplies.  Patient wears her own compression and is tolerating it well.  Patient offers no wound related complaints, denies increased pain or drainage.  Denies fever or chills.      8/15/2024: Follow-up venous stasis ulcer of the left calf.  She still has not received her order.  She does have some Hydrofera Blue and endoform.  The Hydrofera Blue is been sticking.  She is wearing her compression stocking.    8/8/2024: Follow-up venous stasis ulcer of the left calf.  Once again, she has not received her ordered dressings.  We have not heard  from the company nor has the patient.  She did get 20 to 30 mmHg compression stocking and is using that.  Hydrofera Blue and endoform.    8/1/2024: Follow-up venous stasis ulcer of the left calf.  Unfortunately, she did not receive her ordered dressings.  We only found out today that they were not covered.  Using SurePress but does not really like it.  She notes moderate drainage.  No fever or chills.  No increased pain.    7/25/2024: First visit for this 49-year-old female who comes to the wound center because of nonhealing ulceration on her left calf.  Patient has a history of ulceration on the same leg in 2023.  At that time she saw wound care in [place] with eventual healing.  She did have venous Duplex Dopplers with deep veins appearing competent bilaterally. The greater and lesser saphenous veins appear patent and competent bilaterally. Reflux was detected in both   saphenofemoral junctions.  (2023)  In March of this year, she developed a small ulceration which has increased in size.  Over the last month or 2 there is been possible decrease in size slightly.  She describes a burning sensation.  She does wear 15 to 20 mm compression stocking.  Current treatment has been with a bordered, silver foam.  (Mepilex Ag)   There is no history of diabetes.          The following portions of the patient's history were reviewed and updated as appropriate:   Patient Active Problem List   Diagnosis   • Venous stasis ulcer of left calf with fat layer exposed (HCC)     Past Medical History:   Diagnosis Date   • Hypercholesterolemia      No past surgical history on file.  Family History   Problem Relation Age of Onset   • Heart disease Father    • Colon cancer Maternal Grandmother       Social History     Socioeconomic History   • Marital status: Single     Spouse name: Not on file   • Number of children: Not on file   • Years of education: Not on file   • Highest education level: Not on file   Occupational History   • Not  on file   Tobacco Use   • Smoking status: Never   • Smokeless tobacco: Not on file   Vaping Use   • Vaping status: Never Used   Substance and Sexual Activity   • Alcohol use: Not on file   • Drug use: Never   • Sexual activity: Not on file   Other Topics Concern   • Not on file   Social History Narrative   • Not on file     Social Determinants of Health     Financial Resource Strain: Not on file   Food Insecurity: Not on file   Transportation Needs: Not on file   Physical Activity: Not on file   Stress: Not on file   Social Connections: Not on file   Intimate Partner Violence: Not on file   Housing Stability: Not on file        Current Outpatient Medications:   •  cetirizine (ZyrTEC Allergy) 10 mg tablet, Take 10 mg by mouth daily, Disp: , Rfl:   •  desvenlafaxine (PRISTIQ) 100 mg 24 hr tablet, Take 100 mg by mouth daily, Disp: , Rfl:   •  LaMICtal 25 MG tablet, Take 25 mg by mouth daily, Disp: , Rfl:   •  montelukast (SINGULAIR) 10 mg tablet, Take 10 mg by mouth daily at bedtime, Disp: , Rfl:   •  Multiple Vitamins-Minerals (MULTIVITAMIN ADULTS 50+ PO), Multivitamin, Disp: , Rfl:   •  simvastatin (ZOCOR) 20 mg tablet, Take 20 mg by mouth daily, Disp: , Rfl:   •  topiramate (TOPAMAX) 50 MG tablet, Take 50 mg by mouth daily, Disp: , Rfl:   •  Topiramate ER 50 MG CP24, Take 50 mg by mouth daily, Disp: , Rfl:   •  Triamcinolone Acetonide (Nasacort Allergy) 55 MCG/ACT nasal spray, 2 sprays into each nostril daily, Disp: , Rfl:   No current facility-administered medications for this visit.    Review of Systems   Constitutional:  Negative for chills and fever.   HENT:  Negative for congestion and sneezing.    Respiratory:  Negative for cough and shortness of breath.    Cardiovascular:  Positive for leg swelling.   Skin:  Positive for wound (Left calf).        LLE   Psychiatric/Behavioral:  Negative for agitation.        Objective:  /80   Pulse 62   Temp (!) 96 °F (35.6 °C)   Resp 16   Pain Score: 0-No pain      Physical Exam  Vitals and nursing note reviewed.   Constitutional:       Appearance: Normal appearance. She is well-developed.   HENT:      Head: Normocephalic and atraumatic.   Cardiovascular:      Rate and Rhythm: Normal rate.   Pulmonary:      Effort: Pulmonary effort is normal.   Musculoskeletal:      Left lower le+ Edema present.   Skin:     General: Skin is warm and dry.      Findings: Wound present. No erythema.             Comments: Irregular ulceration with areas of slough but less than previous.  Increased epithelization centrally again.  Overall dimensions less.  Moderate serosanguineous drainage.  No malodor and no signs of infection.  Dusky discoloration lateral to the ulcer.  Areas of hyperpigmentation from previous ulcers.   Neurological:      Mental Status: She is alert and oriented to person, place, and time.   Psychiatric:         Attention and Perception: Attention normal.         Mood and Affect: Mood and affect normal.         Behavior: Behavior is cooperative.         Cognition and Memory: Cognition normal.         Wound 24 Venous Ulcer Pretibial Left;Lateral (Active)   Wound Image Images linked 24 1611   Wound Description Granulation tissue;Epithelialization;Yellow 24 1549   Alona-wound Assessment Hyperpigmented 24 1549   Wound Length (cm) 6.5 cm 24 1549   Wound Width (cm) 2.5 cm 24 1549   Wound Depth (cm) 0.2 cm 24 1549   Wound Surface Area (cm^2) 16.25 cm^2 24 1549   Wound Volume (cm^3) 3.25 cm^3 24 1549   Calculated Wound Volume (cm^3) 3.25 cm^3 24 1549   Change in Wound Size % 3.27 24 1549   Drainage Amount Moderate 24 1549   Drainage Description Serosanguineous 24 1549   Non-staged Wound Description Full thickness 24 1549   Dressing Status Intact 24 1549        Debridement   Wound 24 Venous Ulcer Pretibial Left;Lateral    Universal Protocol:  procedure performed by consultantConsent:  "Verbal consent obtained. Written consent obtained.  Consent given by: patient  Time out: Immediately prior to procedure a \"time out\" was called to verify the correct patient, procedure, equipment, support staff and site/side marked as required.  Patient understanding: patient states understanding of the procedure being performed  Patient identity confirmed: verbally with patient    Debridement Details  Performed by: physician  Debridement type: surgical  Level of debridement: subcutaneous tissue  Pain control: lidocaine 4%      Post-debridement measurements  Length (cm): 6.5  Width (cm): 2.5  Depth (cm): 0.3  Percent debrided: 40%  Surface Area (cm^2): 16.25  Area Debrided (cm^2): 6.5  Volume (cm^3): 4.88    Tissue and other material debrided: dermis and subcutaneous tissue  Devitalized tissue debrided: fibrin and slough  Instrument(s) utilized: curette  Bleeding: medium  Hemostasis obtained with: pressure  Procedural pain (0-10): 3  Post-procedural pain: 1   Response to treatment: procedure was tolerated well               Lab Results   Component Value Date    HGBA1C 5.5 08/18/2023       Wound Instructions:  Orders Placed This Encounter   Procedures   • Wound cleansing and dressings Venous Ulcer Left;Lateral Pretibial     Orders Placed This Encounter  Procedures  · Wound cleansing and dressings Venous Ulcer Left;Lateral Pretibial      Left lower leg wound     Wash your hands with soap and water.  Remove old dressing, discard into plastic bag and place in trash. Cleanse the wound with soap and water, vashe prior to applying a clean dressing. Do not use tissue or cotton balls. Do not scrub the wound. Pat dry using gauze.  Shower yes   Apply collagen (either puracol or Endoform) then apply Hydrafera blue  Cover with abd  Secure with adelia and tape  Change dressing 2-3 x week (If Hydrafera is sticking try changing the dressing less frequently)     Schedule with vascular surgery      Standing Status:   Future     " " Standing Expiration Date:   9/5/2024  · Wound cleansing and dressings Venous Ulcer Left;Lateral Pretibial      Compression Stocking 20-30 mmhg     Remove compression stockings every night HS and re-apply first thing qAM. Follow daily skin care as instructed.     Avoid prolonged standing in one place.     Elevate leg(s) above the level of the heart when sitting or as much as possible.     Standing Status:   Future     Standing Expiration Date:   9/12/2024   • Wound Procedure Treatment Venous Ulcer Left;Lateral Pretibial     This order was created via procedure documentation   • Debridement Venous Ulcer Left;Lateral Pretibial     This order was created via procedure documentation             Kodak Romero MD, CHT, CWS    Portions of the record may have been created with voice recognition software. Occasional wrong word or \"sound alike\" substitutions may have occurred due to the inherent limitations of voice recognition software. Read the chart carefully and recognize, using context, where substitutions have occurred.      "

## 2024-09-19 ENCOUNTER — OFFICE VISIT (OUTPATIENT)
Dept: WOUND CARE | Facility: HOSPITAL | Age: 49
End: 2024-09-19
Payer: COMMERCIAL

## 2024-09-19 VITALS
SYSTOLIC BLOOD PRESSURE: 120 MMHG | DIASTOLIC BLOOD PRESSURE: 84 MMHG | HEART RATE: 80 BPM | TEMPERATURE: 97.4 F | RESPIRATION RATE: 16 BRPM

## 2024-09-19 DIAGNOSIS — L92.9 HYPERGRANULATION: ICD-10-CM

## 2024-09-19 DIAGNOSIS — L97.222 VENOUS STASIS ULCER OF LEFT CALF WITH FAT LAYER EXPOSED, UNSPECIFIED WHETHER VARICOSE VEINS PRESENT (HCC): Primary | ICD-10-CM

## 2024-09-19 DIAGNOSIS — I83.022 VENOUS STASIS ULCER OF LEFT CALF WITH FAT LAYER EXPOSED, UNSPECIFIED WHETHER VARICOSE VEINS PRESENT (HCC): Primary | ICD-10-CM

## 2024-09-19 PROCEDURE — 17250 CHEM CAUT OF GRANLTJ TISSUE: CPT | Performed by: FAMILY MEDICINE

## 2024-09-19 RX ORDER — LIDOCAINE 40 MG/G
CREAM TOPICAL ONCE
Status: COMPLETED | OUTPATIENT
Start: 2024-09-19 | End: 2024-09-19

## 2024-09-19 RX ADMIN — LIDOCAINE: 40 CREAM TOPICAL at 15:16

## 2024-09-19 NOTE — PROGRESS NOTES
Patient ID: Johanny Rodriguez is a 49 y.o. female Date of Birth 1975       Chief Complaint   Patient presents with    Follow Up Wound Care Visit     Left leg wound       Allergies:  Antihistamines, diphenhydramine-type; Lactose - food allergy; and Latex    Diagnosis:      Diagnosis ICD-10-CM Associated Orders   1. Venous stasis ulcer of left calf with fat layer exposed, unspecified whether varicose veins present (formerly [facility])  I83.022 lidocaine (LMX) 4 % cream    L97.222 Wound cleansing and dressings Venous Ulcer Left;Lateral Pretibial     Wound Procedure Treatment Venous Ulcer Left;Lateral Pretibial     Chemical Caut Of A Wound      2. Hypergranulation  L92.9 Chemical Caut Of A Wound              Assessment & Plan:  The stasis ulcer of the left lower extremity.  Overall improvement.  Granulation tissue is now noted.  Silver nitrate chemical cauterization.  DC endoform but continue with Hydrofera Blue.         Subjective:   9/19/2024: Follow-up venous stasis ulcer of the left lower extremity.  Improved discomfort.  Notices that the endoform is not dissolving as much as it used to.  Overall improving.    9/5/2024: Follow-up venous stasis ulcer of the left lateral calf.  Has some discomfort but not worse than previous.  Endoform and Hydrofera Blue being used.  No fever or chills.    8/22/24: Patient presents to wound healing center for follow-up visit of left lower extremity venous ulcer.  Patient has been using endoform and Hydrofera Blue to the wound.  Patient states that she has had issues with obtaining wound care supplies through her vendor however this has been rectified and she has received her supplies.  Patient wears her own compression and is tolerating it well.  Patient offers no wound related complaints, denies increased pain or drainage.  Denies fever or chills.      8/15/2024: Follow-up venous stasis ulcer of the left calf.  She still has not received her order.  She does have some Hydrofera Blue and endoform.   The Hydrofera Blue is been sticking.  She is wearing her compression stocking.    8/8/2024: Follow-up venous stasis ulcer of the left calf.  Once again, she has not received her ordered dressings.  We have not heard from the company nor has the patient.  She did get 20 to 30 mmHg compression stocking and is using that.  Hydrofera Blue and endoform.    8/1/2024: Follow-up venous stasis ulcer of the left calf.  Unfortunately, she did not receive her ordered dressings.  We only found out today that they were not covered.  Using SurePress but does not really like it.  She notes moderate drainage.  No fever or chills.  No increased pain.    7/25/2024: First visit for this 49-year-old female who comes to the wound center because of nonhealing ulceration on her left calf.  Patient has a history of ulceration on the same leg in 2023.  At that time she saw wound care in [place] with eventual healing.  She did have venous Duplex Dopplers with deep veins appearing competent bilaterally. The greater and lesser saphenous veins appear patent and competent bilaterally. Reflux was detected in both   saphenofemoral junctions.  (2023)  In March of this year, she developed a small ulceration which has increased in size.  Over the last month or 2 there is been possible decrease in size slightly.  She describes a burning sensation.  She does wear 15 to 20 mm compression stocking.  Current treatment has been with a bordered, silver foam.  (Mepilex Ag)   There is no history of diabetes.          The following portions of the patient's history were reviewed and updated as appropriate:   Patient Active Problem List   Diagnosis    Venous stasis ulcer of left calf with fat layer exposed (HCC)     Past Medical History:   Diagnosis Date    Hypercholesterolemia      No past surgical history on file.  Family History   Problem Relation Age of Onset    Heart disease Father     Colon cancer Maternal Grandmother       Social History      Socioeconomic History    Marital status: Single     Spouse name: Not on file    Number of children: Not on file    Years of education: Not on file    Highest education level: Not on file   Occupational History    Not on file   Tobacco Use    Smoking status: Never    Smokeless tobacco: Not on file   Vaping Use    Vaping status: Never Used   Substance and Sexual Activity    Alcohol use: Not on file    Drug use: Never    Sexual activity: Not on file   Other Topics Concern    Not on file   Social History Narrative    Not on file     Social Determinants of Health     Financial Resource Strain: Not on file   Food Insecurity: Not on file   Transportation Needs: Not on file   Physical Activity: Not on file   Stress: Not on file   Social Connections: Not on file   Intimate Partner Violence: Not on file   Housing Stability: Not on file        Current Outpatient Medications:     cetirizine (ZyrTEC Allergy) 10 mg tablet, Take 10 mg by mouth daily, Disp: , Rfl:     desvenlafaxine (PRISTIQ) 100 mg 24 hr tablet, Take 100 mg by mouth daily, Disp: , Rfl:     LaMICtal 25 MG tablet, Take 25 mg by mouth daily, Disp: , Rfl:     montelukast (SINGULAIR) 10 mg tablet, Take 10 mg by mouth daily at bedtime, Disp: , Rfl:     Multiple Vitamins-Minerals (MULTIVITAMIN ADULTS 50+ PO), Multivitamin, Disp: , Rfl:     simvastatin (ZOCOR) 20 mg tablet, Take 20 mg by mouth daily, Disp: , Rfl:     topiramate (TOPAMAX) 50 MG tablet, Take 50 mg by mouth daily, Disp: , Rfl:     Topiramate ER 50 MG CP24, Take 50 mg by mouth daily, Disp: , Rfl:     Triamcinolone Acetonide (Nasacort Allergy) 55 MCG/ACT nasal spray, 2 sprays into each nostril daily, Disp: , Rfl:   No current facility-administered medications for this visit.    Review of Systems   Constitutional:  Negative for chills and fever.   HENT:  Negative for congestion and sneezing.    Respiratory:  Negative for cough and shortness of breath.    Cardiovascular:  Positive for leg swelling.   Skin:   Positive for wound (Left calf).        LLE   Psychiatric/Behavioral:  Negative for agitation.        Objective:  /84   Pulse 80   Temp (!) 97.4 °F (36.3 °C)   Resp 16   Pain Score: 0-No pain     Physical Exam  Vitals and nursing note reviewed.   Constitutional:       Appearance: Normal appearance. She is well-developed.   HENT:      Head: Normocephalic and atraumatic.   Cardiovascular:      Rate and Rhythm: Normal rate.   Pulmonary:      Effort: Pulmonary effort is normal.   Musculoskeletal:      Left lower le+ Edema present.   Skin:     General: Skin is warm and dry.      Findings: Wound present. No erythema.             Comments: Irregular ulcer with mainly hypergranulation tissue.  Other areas have epithelialized.  No significant fibrin or slough.  No malodor and no signs of infection.  Dusky discoloration lateral to the ulcer.  Areas of hyperpigmentation from previous ulcers.   Neurological:      Mental Status: She is alert and oriented to person, place, and time.   Psychiatric:         Attention and Perception: Attention normal.         Mood and Affect: Mood and affect normal.         Behavior: Behavior is cooperative.         Cognition and Memory: Cognition normal.         Wound 24 Venous Ulcer Pretibial Left;Lateral (Active)   Wound Image Images linked 24 151   Wound Description Granulation tissue;Epithelialization;Yellow;Hypergranulation 24 151   Alona-wound Assessment Hyperpigmented 24 151   Wound Length (cm) 6.3 cm 24 151   Wound Width (cm) 1.4 cm 24 1512   Wound Depth (cm) 0.2 cm 24 1512   Wound Surface Area (cm^2) 8.82 cm^2 24 1512   Wound Volume (cm^3) 1.764 cm^3 24 1512   Calculated Wound Volume (cm^3) 1.76 cm^3 24 151   Change in Wound Size % 47.62 24 1512   Drainage Amount Small 24 1512   Drainage Description Serosanguineous;Lara;Milky 24 151   Non-staged Wound Description Full thickness 24 151  "  Dressing Status Intact (sticking) 09/19/24 1512            Chemical caut of a wound Venous Ulcer Left;Lateral Pretibial     Date/Time  9/19/2024 3:00 PM     Performed by  Kodak Romero MD   Authorized by  Kodak Romero MD        Associated wounds:   Wound 07/25/24 Venous Ulcer Pretibial Left;Lateral   Universal Protocol   procedure performed by consultantConsent: Verbal consent obtained. Written consent obtained.  Risks and benefits: risks, benefits and alternatives were discussed  Consent given by: patient  Time out: Immediately prior to procedure a \"time out\" was called to verify the correct patient, procedure, equipment, support staff and site/side marked as required.  Patient understanding: patient states understanding of the procedure being performed  Patient identity confirmed: verbally with patient      Local anesthesia used: yes     Anesthesia   Local anesthesia used: yes  Local Anesthetic: topical anesthetic     Sedation   Patient sedated: no        Specimen: no    Culture: no   Procedure Details   Procedure Notes: Silver nitrate was used to cauterize the hypergranulation tissue.  Normal saline was used to neutralize the reaction.  1 silver nitrate stick was used for this procedure.  Patient tolerance: Patient tolerated the procedure well with no immediate complications                    Lab Results   Component Value Date    HGBA1C 5.5 08/18/2023       Wound Instructions:  Orders Placed This Encounter   Procedures    Wound cleansing and dressings Venous Ulcer Left;Lateral Pretibial     Wash your hands with soap and water.  Remove old dressing, discard into plastic bag and place in trash. Cleanse the wound with soap and water, vashe prior to applying a clean dressing. Do not use tissue or cotton balls. Do not scrub the wound. Pat dry using gauze.  Shower yes     Apply Hydrafera blue to wound   Cover with abd  Secure with adelia and tape  Change dressing 2-3 x week (If Hydrafera is " "sticking try changing the dressing less frequently)        Compression Stocking    Remove compression stockings every night HS and re-apply first thing qAM. Follow daily skin care as instructed.    Avoid prolonged standing in one place.    Elevate leg(s) above the level of the heart when sitting or as much as possible.     Standing Status:   Future     Standing Expiration Date:   10/3/2024    Wound Procedure Treatment Venous Ulcer Left;Lateral Pretibial     This order was created via procedure documentation    Chemical Caut Of A Wound     This order was created via procedure documentation             Kodak Romero MD, CHT, CWS    Portions of the record may have been created with voice recognition software. Occasional wrong word or \"sound alike\" substitutions may have occurred due to the inherent limitations of voice recognition software. Read the chart carefully and recognize, using context, where substitutions have occurred.      "

## 2024-09-19 NOTE — PATIENT INSTRUCTIONS
Orders Placed This Encounter   Procedures    Wound cleansing and dressings Venous Ulcer Left;Lateral Pretibial     Wash your hands with soap and water.  Remove old dressing, discard into plastic bag and place in trash. Cleanse the wound with soap and water, vashe prior to applying a clean dressing. Do not use tissue or cotton balls. Do not scrub the wound. Pat dry using gauze.  Shower yes     Apply Hydrafera blue to wound   Cover with abd  Secure with adelia and tape  Change dressing 2-3 x week (If Hydrafera is sticking try changing the dressing less frequently)        Compression Stocking    Remove compression stockings every night HS and re-apply first thing qAM. Follow daily skin care as instructed.    Avoid prolonged standing in one place.    Elevate leg(s) above the level of the heart when sitting or as much as possible.     Standing Status:   Future     Standing Expiration Date:   10/3/2024

## 2024-09-19 NOTE — PROGRESS NOTES
Wound Procedure Treatment Venous Ulcer Left;Lateral Pretibial    Performed by: Ann Marie Luciano RN  Authorized by: Kodak Romero MD    Associated wounds:   Wound 07/25/24 Venous Ulcer Pretibial Left;Lateral  Wound cleansed with:  NSS  Applied primary dressing:  Hydrafera blue ready  Applied secondary dressing:  ABD  Dressing secured with:  Adri and Tape    Personal compression stockings applied

## 2024-09-30 ENCOUNTER — TELEPHONE (OUTPATIENT)
Dept: VASCULAR SURGERY | Facility: CLINIC | Age: 49
End: 2024-09-30

## 2024-09-30 NOTE — TELEPHONE ENCOUNTER
Called pt and lmom to inform them that JAM will not be in the office for their appt 10/7 and we are calling to reschedule, we have 10/1 appt times available in [place] for DSC and MBM

## 2024-10-03 ENCOUNTER — OFFICE VISIT (OUTPATIENT)
Dept: WOUND CARE | Facility: HOSPITAL | Age: 49
End: 2024-10-03
Payer: COMMERCIAL

## 2024-10-03 VITALS
SYSTOLIC BLOOD PRESSURE: 118 MMHG | HEART RATE: 78 BPM | TEMPERATURE: 97.1 F | DIASTOLIC BLOOD PRESSURE: 88 MMHG | RESPIRATION RATE: 18 BRPM

## 2024-10-03 DIAGNOSIS — I83.022 VENOUS STASIS ULCER OF LEFT CALF WITH FAT LAYER EXPOSED, UNSPECIFIED WHETHER VARICOSE VEINS PRESENT (HCC): Primary | ICD-10-CM

## 2024-10-03 DIAGNOSIS — L97.222 VENOUS STASIS ULCER OF LEFT CALF WITH FAT LAYER EXPOSED, UNSPECIFIED WHETHER VARICOSE VEINS PRESENT (HCC): Primary | ICD-10-CM

## 2024-10-03 PROCEDURE — 97597 DBRDMT OPN WND 1ST 20 CM/<: CPT

## 2024-10-03 RX ORDER — LIDOCAINE 40 MG/G
CREAM TOPICAL ONCE
Status: COMPLETED | OUTPATIENT
Start: 2024-10-03 | End: 2024-10-03

## 2024-10-03 RX ADMIN — LIDOCAINE: 40 CREAM TOPICAL at 09:55

## 2024-10-03 NOTE — PROGRESS NOTES
Patient ID: Johanny Rodriguez is a 49 y.o. female Date of Birth 1975       Chief Complaint   Patient presents with    Follow Up Wound Care Visit     Left lower leg wound       Allergies:  Antihistamines, diphenhydramine-type; Lactose - food allergy; and Latex    Diagnosis:      Diagnosis ICD-10-CM Associated Orders   1. Venous stasis ulcer of left calf with fat layer exposed, unspecified whether varicose veins present ([facility])  I83.022 lidocaine (LMX) 4 % cream    L97.222 Wound cleansing and dressings Venous Ulcer Left;Lateral Pretibial     Wound cleansing and dressings Venous Ulcer Left;Lateral Pretibial     Wound Procedure Treatment Venous Ulcer Left;Lateral Pretibial     Debridement              Assessment & Plan:  Wound measurements are stable however noted increased epithelialization. Small amount of hypergranulation tissue present.  Will recommend continue with current wound management of Hydrofera Blue and compression stockings for edema management.  See wounds orders below  Wound is not showing any s/s of clinical infection.   Debrided as below.   Elevate lower extremities and avoid prolonged standing/keeping legs in dependent position for edema management.   Counseled patient on the importance of adequate protein intake (3-4 servings per day) to promote wound healing.  Follow-up in 2 week(s). Call sooner with questions or concerns or any signs of infection such as redness, swelling, increased/purulent drainage, fever or chills, and increased pain.  Patient verbalized understanding and agrees with plan of care.          Subjective:   8/22/24: Patient presents to wound healing center for follow-up visit of left lower extremity venous ulcer.  Patient has been using endoform and Hydrofera Blue to the wound.  Patient states that she has had issues with obtaining wound care supplies through her vendor however this has been rectified and she has received her supplies.  Patient wears her own compression and is  tolerating it well.  Patient offers no wound related complaints, denies increased pain or drainage.  Denies fever or chills.    10/3/24: Patient presents to wound care center for follow-up visit left lower extremity venous ulcer.  Patient has been using Hydrofera Blue to the wound.  Patient denies increased pain or drainage from the wound.  Patient uses her own compression stockings for edema management.  No fever or chills.  Patient states that she has a follow-up appointment with vascular on 10/22/2024.        The following portions of the patient's history were reviewed and updated as appropriate:   Patient Active Problem List   Diagnosis    Venous stasis ulcer of left calf with fat layer exposed (HCC)     Past Medical History:   Diagnosis Date    Hypercholesterolemia      No past surgical history on file.  Family History   Problem Relation Age of Onset    Heart disease Father     Colon cancer Maternal Grandmother       Social History     Socioeconomic History    Marital status: Single     Spouse name: Not on file    Number of children: Not on file    Years of education: Not on file    Highest education level: Not on file   Occupational History    Not on file   Tobacco Use    Smoking status: Never    Smokeless tobacco: Not on file   Vaping Use    Vaping status: Never Used   Substance and Sexual Activity    Alcohol use: Not on file    Drug use: Never    Sexual activity: Not on file   Other Topics Concern    Not on file   Social History Narrative    Not on file     Social Determinants of Health     Financial Resource Strain: Not on file   Food Insecurity: Not on file   Transportation Needs: Not on file   Physical Activity: Not on file   Stress: Not on file   Social Connections: Not on file   Intimate Partner Violence: Not on file   Housing Stability: Not on file        Current Outpatient Medications:     cetirizine (ZyrTEC Allergy) 10 mg tablet, Take 10 mg by mouth daily, Disp: , Rfl:     desvenlafaxine (PRISTIQ)  100 mg 24 hr tablet, Take 100 mg by mouth daily, Disp: , Rfl:     LaMICtal 25 MG tablet, Take 25 mg by mouth daily, Disp: , Rfl:     montelukast (SINGULAIR) 10 mg tablet, Take 10 mg by mouth daily at bedtime, Disp: , Rfl:     Multiple Vitamins-Minerals (MULTIVITAMIN ADULTS 50+ PO), Multivitamin, Disp: , Rfl:     simvastatin (ZOCOR) 20 mg tablet, Take 20 mg by mouth daily, Disp: , Rfl:     topiramate (TOPAMAX) 50 MG tablet, Take 50 mg by mouth daily, Disp: , Rfl:     Topiramate ER 50 MG CP24, Take 50 mg by mouth daily, Disp: , Rfl:     Triamcinolone Acetonide (Nasacort Allergy) 55 MCG/ACT nasal spray, 2 sprays into each nostril daily, Disp: , Rfl:   No current facility-administered medications for this visit.    Review of Systems   Constitutional:  Negative for chills and fever.   HENT:  Negative for congestion and sneezing.    Respiratory:  Negative for cough and shortness of breath.    Cardiovascular:  Positive for leg swelling.   Skin:  Positive for wound.        LLE   Psychiatric/Behavioral:  Negative for agitation.        Objective:  /88   Pulse 78   Temp (!) 97.1 °F (36.2 °C)   Resp 18   Pain Score: 0-No pain       Wound 07/25/24 Venous Ulcer Pretibial Left;Lateral (Active)   Wound Image   10/03/24 0951   Wound Description Epithelialization;Hypergranulation;Granulation tissue 10/03/24 0951   Alona-wound Assessment Hyperpigmented 10/03/24 0951   Wound Length (cm) 6.3 cm 10/03/24 0951   Wound Width (cm) 1.4 cm 10/03/24 0951   Wound Depth (cm) 0.1 cm 10/03/24 0951   Wound Surface Area (cm^2) 8.82 cm^2 10/03/24 0951   Wound Volume (cm^3) 0.882 cm^3 10/03/24 0951   Calculated Wound Volume (cm^3) 0.88 cm^3 10/03/24 0951   Change in Wound Size % 73.81 10/03/24 0951   Drainage Amount Small 10/03/24 0951   Drainage Description Serosanguineous 10/03/24 0951   Non-staged Wound Description Full thickness 10/03/24 0951   Dressing Status Removed 10/03/24 0951           Debridement   Wound 07/25/24 Venous Ulcer  "Pretibial Left;Lateral    Universal Protocol:  Consent: Verbal consent obtained.  Risks and benefits: risks, benefits and alternatives were discussed  Consent given by: patient  Time out: Immediately prior to procedure a \"time out\" was called to verify the correct patient, procedure, equipment, support staff and site/side marked as required.  Timeout called at: 10/3/2024 9:30 AM.  Patient understanding: patient states understanding of the procedure being performed  Patient identity confirmed: verbally with patient    Debridement Details  Performed by: NP  Debridement type: selective  Pain control: lidocaine 4%      Post-debridement measurements  Length (cm): 6.3  Width (cm): 1.4  Depth (cm): 0.1  Percent debrided: 50%  Surface Area (cm^2): 8.82  Area Debrided (cm^2): 4.41  Volume (cm^3): 0.88    Devitalized tissue debrided: biofilm, exudate and slough  Instrument(s) utilized: curette  Bleeding: small  Hemostasis obtained with: pressure  Procedural pain (0-10): 0  Post-procedural pain: 0   Response to treatment: procedure was tolerated well               Lab Results   Component Value Date    HGBA1C 5.5 08/18/2023       Wound Instructions:  Orders Placed This Encounter   Procedures    Wound cleansing and dressings Venous Ulcer Left;Lateral Pretibial     Left;Lateral Pretibial       Wash your hands with soap and water.  Remove old dressing, discard into plastic bag and place in trash. Cleanse the wound with soap and water, vashe prior to applying a clean dressing. Do not use tissue or cotton balls. Do not scrub the wound. Pat dry using gauze.  Shower yes      Apply Hydrafera blue to wound   Cover with abd  Secure with adelia and tape  Change dressing 2-3 x week (If Hydrafera is sticking try changing the dressing less frequently)     Standing Status:   Future     Standing Expiration Date:   10/17/2024    Wound cleansing and dressings Venous Ulcer Left;Lateral Pretibial     Compression Stocking     Remove compression " "stockings every night HS and re-apply first thing qAM. Follow daily skin care as instructed.     Avoid prolonged standing in one place.     Elevate leg(s) above the level of the heart when sitting or as much as possible.     Standing Status:   Future     Standing Expiration Date:   10/17/2024    Wound Procedure Treatment Venous Ulcer Left;Lateral Pretibial     This order was created via procedure documentation    Debridement     This order was created via procedure documentation           MAYE Edouard, CATALINA, ZENON    Portions of the record may have been created with voice recognition software. Occasional wrong word or \"sound alike\" substitutions may have occurred due to the inherent limitations of voice recognition software. Read the chart carefully and recognize, using context, where substitutions have occurred.          Total time spent today:    Total time (face-to-face and non-face-to-face) spent on today's visit was 14 minutes. This includes preparation for the visits (i.e. reviewing test results from date recent hospitalizations, ER/Urgent Care/primary care visits and recent consultant office visits), performance of a medically appropriate history and examination, and orders for medications or testing.   "

## 2024-10-03 NOTE — PATIENT INSTRUCTIONS
Orders Placed This Encounter   Procedures    Wound cleansing and dressings Venous Ulcer Left;Lateral Pretibial     Left;Lateral Pretibial       Wash your hands with soap and water.  Remove old dressing, discard into plastic bag and place in trash. Cleanse the wound with soap and water, vashe prior to applying a clean dressing. Do not use tissue or cotton balls. Do not scrub the wound. Pat dry using gauze.  Shower yes      Apply Hydrafera blue to wound   Cover with abd  Secure with adelia and tape  Change dressing 2-3 x week (If Hydrafera is sticking try changing the dressing less frequently)     Standing Status:   Future     Standing Expiration Date:   10/17/2024    Wound cleansing and dressings Venous Ulcer Left;Lateral Pretibial     Compression Stocking     Remove compression stockings every night HS and re-apply first thing qAM. Follow daily skin care as instructed.     Avoid prolonged standing in one place.     Elevate leg(s) above the level of the heart when sitting or as much as possible.     Standing Status:   Future     Standing Expiration Date:   10/17/2024

## 2024-10-03 NOTE — PROGRESS NOTES
Wound Procedure Treatment Venous Ulcer Left;Lateral Pretibial    Performed by: Rekha Frazier RN  Authorized by: MAYE Skelton    Associated wounds:   Wound 07/25/24 Venous Ulcer Pretibial Left;Lateral  Wound cleansed with:  NSS  Applied primary dressing:  Hydrafera blue  Applied secondary dressing:  ABD  Dressing secured with:  Adri and Tape

## 2024-10-17 ENCOUNTER — OFFICE VISIT (OUTPATIENT)
Dept: WOUND CARE | Facility: HOSPITAL | Age: 49
End: 2024-10-17
Payer: COMMERCIAL

## 2024-10-17 DIAGNOSIS — I83.022 VENOUS STASIS ULCER OF LEFT CALF WITH FAT LAYER EXPOSED, UNSPECIFIED WHETHER VARICOSE VEINS PRESENT (HCC): Primary | ICD-10-CM

## 2024-10-17 DIAGNOSIS — L97.222 VENOUS STASIS ULCER OF LEFT CALF WITH FAT LAYER EXPOSED, UNSPECIFIED WHETHER VARICOSE VEINS PRESENT (HCC): Primary | ICD-10-CM

## 2024-10-17 PROCEDURE — 99212 OFFICE O/P EST SF 10 MIN: CPT | Performed by: FAMILY MEDICINE

## 2024-10-17 PROCEDURE — 99213 OFFICE O/P EST LOW 20 MIN: CPT | Performed by: FAMILY MEDICINE

## 2024-10-17 RX ORDER — LIDOCAINE HYDROCHLORIDE 40 MG/ML
5 SOLUTION TOPICAL ONCE
Status: COMPLETED | OUTPATIENT
Start: 2024-10-17 | End: 2024-10-17

## 2024-10-17 RX ADMIN — LIDOCAINE HYDROCHLORIDE 5 ML: 40 SOLUTION TOPICAL at 09:23

## 2024-10-17 NOTE — PROGRESS NOTES
Wound Procedure Treatment Venous Ulcer Left;Lateral Pretibial    Performed by: Chelle Tejada RN  Authorized by: Kodak Romero MD    Associated wounds:   Wound 07/25/24 Venous Ulcer Pretibial Left;Lateral  Wound cleansed with:  Soap and water  Applied primary dressing:  Hydrafera blue  Applied secondary dressing:  Gauze  Dressing secured with:  Adri and Tape  Comments:  Compression stocking

## 2024-10-17 NOTE — PATIENT INSTRUCTIONS
Orders Placed This Encounter   Procedures    Wound cleansing and dressings     Wound cleansing and dressings Venous Ulcer Left;Lateral Pretibial       Left;Lateral Pretibial                                    Wash your hands with soap and water.  Remove old dressing, discard into plastic bag and place in trash. Cleanse the wound with soap and water, vashe prior to applying a clean dressing. Do not use tissue or cotton balls. Do not scrub the wound. Pat dry using gauze.  Shower yes      Apply Hydrafera blue to wound   Cover with abd  Secure with adelia and tape  Change dressing 2-3 x week (If Hydrafera is sticking try changing the dressing less frequently)      Standing Status:   Future      Standing Expiration Date:   10/17/2024  · Wound cleansing and dressings Venous Ulcer Left;Lateral Pretibial      Compression Stocking     Remove compression stockings every night HS and re-apply first thing qAM. Follow daily skin care as instructed.     Avoid prolonged standing in one place.     Elevate leg(s) above the level of the heart when sitting or as much as possible.     Standing Status:   Future     Standing Expiration Date:   10/24/2024

## 2024-10-17 NOTE — PROGRESS NOTES
Patient ID: Johanny Rodriguez is a 49 y.o. female Date of Birth 1975       Chief Complaint   Patient presents with    Follow Up Wound Care Visit     Left lower leg wound.       Allergies:  Antihistamines, diphenhydramine-type; Lactose - food allergy; and Latex    Diagnosis:      Diagnosis ICD-10-CM Associated Orders   1. Venous stasis ulcer of left calf with fat layer exposed, unspecified whether varicose veins present ([facility])  I83.022 lidocaine (XYLOCAINE) 4 % topical solution 5 mL    L97.222 Wound cleansing and dressings     Wound Procedure Treatment Venous Ulcer Left;Lateral Pretibial              Assessment & Plan:  Chronic recurring venous stasis ulcer of the left calf again improved.  Only 1 small area remains open.  Patient has appointment with vascular surgeon next week.  Continue with Hydrofera Blue.  Compression stockings.         Subjective:   10/17/2024: Follow-up venous stasis ulcer of the left calf.  Continue using Hydrofera Blue.  Notes improvement.  No other complaints.    10/3/24: Patient presents to wound care center for follow-up visit left lower extremity venous ulcer.  Patient has been using Hydrofera Blue to the wound.  Patient denies increased pain or drainage from the wound.  Patient uses her own compression stockings for edema management.  No fever or chills.  Patient states that she has a follow-up appointment with vascular on 10/22/2024.    8/22/24: Patient presents to wound healing center for follow-up visit of left lower extremity venous ulcer.  Patient has been using endoform and Hydrofera Blue to the wound.  Patient states that she has had issues with obtaining wound care supplies through her vendor however this has been rectified and she has received her supplies.  Patient wears her own compression and is tolerating it well.  Patient offers no wound related complaints, denies increased pain or drainage.  Denies fever or chills.        The following portions of the patient's history  were reviewed and updated as appropriate:   Patient Active Problem List   Diagnosis    Venous stasis ulcer of left calf with fat layer exposed (HCC)     Past Medical History:   Diagnosis Date    Hypercholesterolemia      No past surgical history on file.  Family History   Problem Relation Age of Onset    Heart disease Father     Colon cancer Maternal Grandmother       Social History     Socioeconomic History    Marital status: Single     Spouse name: Not on file    Number of children: Not on file    Years of education: Not on file    Highest education level: Not on file   Occupational History    Not on file   Tobacco Use    Smoking status: Never    Smokeless tobacco: Not on file   Vaping Use    Vaping status: Never Used   Substance and Sexual Activity    Alcohol use: Not on file    Drug use: Never    Sexual activity: Not on file   Other Topics Concern    Not on file   Social History Narrative    Not on file     Social Determinants of Health     Financial Resource Strain: Not on file   Food Insecurity: Not on file   Transportation Needs: Not on file   Physical Activity: Not on file   Stress: Not on file   Social Connections: Not on file   Intimate Partner Violence: Not on file   Housing Stability: Not on file        Current Outpatient Medications:     cetirizine (ZyrTEC Allergy) 10 mg tablet, Take 10 mg by mouth daily, Disp: , Rfl:     desvenlafaxine (PRISTIQ) 100 mg 24 hr tablet, Take 100 mg by mouth daily, Disp: , Rfl:     LaMICtal 25 MG tablet, Take 25 mg by mouth daily, Disp: , Rfl:     montelukast (SINGULAIR) 10 mg tablet, Take 10 mg by mouth daily at bedtime, Disp: , Rfl:     Multiple Vitamins-Minerals (MULTIVITAMIN ADULTS 50+ PO), Multivitamin, Disp: , Rfl:     simvastatin (ZOCOR) 20 mg tablet, Take 20 mg by mouth daily, Disp: , Rfl:     topiramate (TOPAMAX) 50 MG tablet, Take 50 mg by mouth daily, Disp: , Rfl:     Topiramate ER 50 MG CP24, Take 50 mg by mouth daily, Disp: , Rfl:     Triamcinolone Acetonide  (Nasacort Allergy) 55 MCG/ACT nasal spray, 2 sprays into each nostril daily, Disp: , Rfl:   No current facility-administered medications for this visit.    Review of Systems   Constitutional:  Negative for chills and fever.   HENT:  Negative for congestion and sneezing.    Respiratory:  Negative for cough and shortness of breath.    Cardiovascular:  Positive for leg swelling.   Skin:  Positive for wound (Left calf).        LLE   Psychiatric/Behavioral:  Negative for agitation.        Objective:  There were no vitals taken for this visit.        Physical Exam  Vitals and nursing note reviewed.   Constitutional:       Appearance: Normal appearance. She is well-developed.   HENT:      Head: Normocephalic and atraumatic.   Cardiovascular:      Rate and Rhythm: Normal rate.   Pulmonary:      Effort: Pulmonary effort is normal.   Musculoskeletal:      Left lower le+ Edema present.   Skin:     General: Skin is warm and dry.      Findings: Wound present. No erythema.             Comments: Chronic hyperpigmentation in the left lateral calf.  Only 1 small area distally remains open.  No other drainage.  No signs of infection.   Neurological:      Mental Status: She is alert and oriented to person, place, and time.   Psychiatric:         Attention and Perception: Attention normal.         Mood and Affect: Mood and affect normal.         Behavior: Behavior is cooperative.         Cognition and Memory: Cognition normal.         Wound 24 Venous Ulcer Pretibial Left;Lateral (Active)   Wound Image Images linked 10/17/24 0916   Wound Description Epithelialization;Granulation tissue 10/17/24 0914   Alona-wound Assessment Hyperpigmented 10/17/24 0914   Wound Length (cm) 0.7 cm 10/17/24 0914   Wound Width (cm) 0.6 cm 10/17/24 0914   Wound Depth (cm) 0.1 cm 10/17/24 0914   Wound Surface Area (cm^2) 0.42 cm^2 10/17/24 0914   Wound Volume (cm^3) 0.042 cm^3 10/17/24 0914   Calculated Wound Volume (cm^3) 0.04 cm^3 10/17/24 0914  "  Change in Wound Size % 98.81 10/17/24 0914   Drainage Amount Small 10/17/24 0914   Non-staged Wound Description Full thickness 10/17/24 0914   Dressing Status Intact 10/17/24 0914                  Lab Results   Component Value Date    HGBA1C 5.5 08/18/2023       Wound Instructions:  Orders Placed This Encounter   Procedures    Wound cleansing and dressings     Wound cleansing and dressings Venous Ulcer Left;Lateral Pretibial       Left;Lateral Pretibial                                    Wash your hands with soap and water.  Remove old dressing, discard into plastic bag and place in trash. Cleanse the wound with soap and water, vashe prior to applying a clean dressing. Do not use tissue or cotton balls. Do not scrub the wound. Pat dry using gauze.  Shower yes      Apply Hydrafera blue to wound   Cover with abd  Secure with adelia and tape  Change dressing 2-3 x week (If Hydrafera is sticking try changing the dressing less frequently)      Standing Status:   Future      Standing Expiration Date:   10/17/2024  · Wound cleansing and dressings Venous Ulcer Left;Lateral Pretibial      Compression Stocking     Remove compression stockings every night HS and re-apply first thing qAM. Follow daily skin care as instructed.     Avoid prolonged standing in one place.     Elevate leg(s) above the level of the heart when sitting or as much as possible.     Standing Status:   Future     Standing Expiration Date:   10/24/2024    Wound Procedure Treatment Venous Ulcer Left;Lateral Pretibial     This order was created via procedure documentation             Kodak Romero MD, CHT, CWS    Portions of the record may have been created with voice recognition software. Occasional wrong word or \"sound alike\" substitutions may have occurred due to the inherent limitations of voice recognition software. Read the chart carefully and recognize, using context, where substitutions have occurred.    "

## 2024-10-22 ENCOUNTER — OFFICE VISIT (OUTPATIENT)
Dept: VASCULAR SURGERY | Facility: CLINIC | Age: 49
End: 2024-10-22
Payer: COMMERCIAL

## 2024-10-22 VITALS
DIASTOLIC BLOOD PRESSURE: 78 MMHG | WEIGHT: 271.9 LBS | HEIGHT: 63 IN | OXYGEN SATURATION: 98 % | SYSTOLIC BLOOD PRESSURE: 120 MMHG | HEART RATE: 94 BPM | RESPIRATION RATE: 18 BRPM | BODY MASS INDEX: 48.18 KG/M2

## 2024-10-22 DIAGNOSIS — L97.222 VENOUS STASIS ULCER OF LEFT CALF WITH FAT LAYER EXPOSED, UNSPECIFIED WHETHER VARICOSE VEINS PRESENT (HCC): ICD-10-CM

## 2024-10-22 DIAGNOSIS — I83.022 VENOUS STASIS ULCER OF LEFT CALF WITH FAT LAYER EXPOSED, UNSPECIFIED WHETHER VARICOSE VEINS PRESENT (HCC): ICD-10-CM

## 2024-10-22 PROCEDURE — 99243 OFF/OP CNSLTJ NEW/EST LOW 30: CPT | Performed by: NURSE PRACTITIONER

## 2024-10-22 RX ORDER — ZOLPIDEM TARTRATE 12.5 MG/1
TABLET, FILM COATED, EXTENDED RELEASE ORAL
COMMUNITY

## 2024-10-22 NOTE — PROGRESS NOTES
Ambulatory Visit  Name: Johanny Rodriguez      : 1975      MRN: [id number]  Encounter Provider: MAYE Gipson  Encounter Date: 10/22/2024   Enc[facility] [place]    Assessment & Plan  Venous stasis ulcer of left calf with fat layer exposed, unspecified whether varicose veins present (HCC)  49-year-old female with HLD, BMI 48, recurrent stasis ulceration left lower extremity referred for vascular evaluation    Left lateral calf ulceration improving with local wound care, large area of hyperpigmentation and skin thickening    -Continue local wound care  -Rx 20-30 mmHg compression stockings given.  Stockings to be worn daily and removed at night  -Will evaluate with venous reflux study to evaluate for underlying venous insufficiency contributing to recurrent wounds  -Advised weight loss  -Regular walking 30 minutes daily and periodic leg elevation  -Follow-up with vascular surgeon to review study and discuss possible treatment options    Orders:    Ambulatory referral to Vascular Surgery    VAS Lower extremity venous insufficiency duplex, Single leg w/ measurements; Future    Compression Stocking      Chief Complaint   Patient presents with    Consult     Leg ulcer on left calf,uses compression stockings and does elevate ulcer is almost close.       History of Present Illness     Johanny Rodriguez is a 49 y.o. female who presents for vascular evaluation.  She is currently going to wound care since July for left lateral calf venous stasis ulceration.  Wound is improving.  She notes current wound started in February/March very small and enlarged gradually.  She was not able to get into wound care due to prior wound care center closed and then gap in care due to insurance coverage and switching jobs.  She denies any significant discomfort in the left lower extremity at current.  She denies any significant heaviness, throbbing, aching discomfort.  She did have a left anterior shin  "quarter sized ulceration in 2023 which healed with 3 months of local wound care.  At that time she was evaluated with a standing venous duplex at [facility] which by report showed no acute DVT/SVT, competent greater and lesser superficial veins, bilateral SFJ incompetency.  She is attempting weight management.  She is using gradient compression stockings obtain on [employer].  Advised to start Jobst 20-30 mmHg compression stockings.  Currently using Hydrafoam blue dressing for left lateral lower leg wound and following at wound care.    History obtained from : patient  Review of Systems   Constitutional: Negative.    HENT: Negative.     Cardiovascular:  Negative for leg swelling.   Skin:  Positive for color change and wound.   Neurological: Negative.    Hematological:  Does not bruise/bleed easily.   Psychiatric/Behavioral: Negative.       Medical History Reviewed by provider this encounter:  Allergies  Meds  Problems  Med Hx  Surg Hx  Fam Hx           Objective   I have reviewed and made appropriate changes to the review of systems input by the medical assistant.    Vitals:    10/22/24 1328   BP: 120/78   BP Location: Left arm   Patient Position: Sitting   Cuff Size: Large   Pulse: 94   Resp: 18   SpO2: 98%   Weight: 123 kg (271 lb 14.4 oz)   Height: 5' 3\" (1.6 m)       Patient Active Problem List   Diagnosis    Venous stasis ulcer of left calf with fat layer exposed (HCC)    Venous stasis ulcer of left calf with fat layer exposed, unspecified whether varicose veins present (HCC)       History reviewed. No pertinent surgical history.    Family History   Problem Relation Age of Onset    Heart disease Father     Colon cancer Maternal Grandmother        Social History     Socioeconomic History    Marital status: Single     Spouse name: Not on file    Number of children: Not on file    Years of education: Not on file    Highest education level: Not on file   Occupational History    Not on file   Tobacco Use    Smoking " "status: Never    Smokeless tobacco: Not on file   Vaping Use    Vaping status: Never Used   Substance and Sexual Activity    Alcohol use: Not on file    Drug use: Never    Sexual activity: Not on file   Other Topics Concern    Not on file   Social History Narrative    Not on file     Social Determinants of Health     Financial Resource Strain: Not on file   Food Insecurity: Not on file   Transportation Needs: Not on file   Physical Activity: Not on file   Stress: Not on file   Social Connections: Not on file   Intimate Partner Violence: Not on file   Housing Stability: Not on file       Allergies   Allergen Reactions    Antihistamines, Diphenhydramine-Type Wheezing    Lactose - Food Allergy Other (See Comments)     Stomach issues    Latex Dermatitis, Hives, Itching, Other (See Comments) and Rash     ITCHY HANDS         Current Outpatient Medications:     cetirizine (ZyrTEC Allergy) 10 mg tablet, Take 10 mg by mouth daily, Disp: , Rfl:     desvenlafaxine (PRISTIQ) 100 mg 24 hr tablet, Take 100 mg by mouth daily, Disp: , Rfl:     LaMICtal 25 MG tablet, Take 50 mg by mouth daily, Disp: , Rfl:     montelukast (SINGULAIR) 10 mg tablet, Take 10 mg by mouth daily at bedtime, Disp: , Rfl:     Multiple Vitamins-Minerals (MULTIVITAMIN ADULTS 50+ PO), Multivitamin, Disp: , Rfl:     simvastatin (ZOCOR) 20 mg tablet, Take 20 mg by mouth daily, Disp: , Rfl:     Topiramate ER 50 MG CP24, Take 50 mg by mouth daily, Disp: , Rfl:     Triamcinolone Acetonide (Nasacort Allergy) 55 MCG/ACT nasal spray, 2 sprays into each nostril daily, Disp: , Rfl:     topiramate (TOPAMAX) 50 MG tablet, Take 50 mg by mouth daily, Disp: , Rfl:     zolpidem (AMBIEN CR) 12.5 MG CR tablet, daily at bedtime as needed, Disp: , Rfl:    /78 (BP Location: Left arm, Patient Position: Sitting, Cuff Size: Large)   Pulse 94   Resp 18   Ht 5' 3\" (1.6 m)   Wt 123 kg (271 lb 14.4 oz)   SpO2 98%   BMI 48.16 kg/m²     Physical Exam  Vitals and nursing note " reviewed.   Constitutional:       Appearance: She is obese.   Eyes:      Extraocular Movements: Extraocular movements intact.   Cardiovascular:      Pulses:           Dorsalis pedis pulses are 2+ on the right side and 2+ on the left side.      Heart sounds: Normal heart sounds.   Pulmonary:      Effort: Pulmonary effort is normal.   Musculoskeletal:         General: No swelling. Normal range of motion.   Skin:     General: Skin is warm.      Comments: Left lateral calf large area of hyperpigmented thin with skin thickening and fibrosis.  Lateral edge of wound with small punctate ulcerations.  See clinical image.   Neurological:      General: No focal deficit present.      Mental Status: She is alert and oriented to person, place, and time.   Psychiatric:         Mood and Affect: Mood normal.         Behavior: Behavior normal.         Administrative Statements   I have spent a total time of 30 minutes in caring for this patient on the day of the visit/encounter including Diagnostic results, Prognosis, Risks and benefits of tx options, Instructions for management, Patient and family education, Importance of tx compliance, Risk factor reductions, Impressions, Counseling / Coordination of care, Documenting in the medical record, Reviewing / ordering tests, medicine, procedures  , and Obtaining or reviewing history  .

## 2024-11-07 ENCOUNTER — OFFICE VISIT (OUTPATIENT)
Dept: WOUND CARE | Facility: HOSPITAL | Age: 49
End: 2024-11-07
Payer: COMMERCIAL

## 2024-11-07 VITALS
DIASTOLIC BLOOD PRESSURE: 80 MMHG | TEMPERATURE: 97.1 F | RESPIRATION RATE: 16 BRPM | SYSTOLIC BLOOD PRESSURE: 118 MMHG | HEART RATE: 80 BPM

## 2024-11-07 DIAGNOSIS — I83.022 VENOUS STASIS ULCER OF LEFT CALF WITH FAT LAYER EXPOSED, UNSPECIFIED WHETHER VARICOSE VEINS PRESENT (HCC): Primary | ICD-10-CM

## 2024-11-07 DIAGNOSIS — L97.222 VENOUS STASIS ULCER OF LEFT CALF WITH FAT LAYER EXPOSED, UNSPECIFIED WHETHER VARICOSE VEINS PRESENT (HCC): Primary | ICD-10-CM

## 2024-11-07 PROCEDURE — 99212 OFFICE O/P EST SF 10 MIN: CPT | Performed by: FAMILY MEDICINE

## 2024-11-07 NOTE — PROGRESS NOTES
Patient ID: Johanny Rodriguez is a 49 y.o. female Date of Birth 1975       Chief Complaint   Patient presents with    Follow Up Wound Care Visit     Left leg wound        Allergies:  Antihistamines, diphenhydramine-type; Lactose - food allergy; and Latex    Diagnosis:      Diagnosis ICD-10-CM Associated Orders   1. Venous stasis ulcer of left calf with fat layer exposed, unspecified whether varicose veins present ([facility])  I83.022 Wound cleansing and dressings Venous Ulcer Left;Lateral Pretibial    L97.222               Assessment & Plan:  Venous stasis ulcer over the left lateral calf and is now closed.  Moisturize.  Continue with 20-30 mmHg compression stockings.  Jobst.  Discharge from wound center.  Follow-up with vascular surgery to see if anything can be corrected.         Subjective:   11/7/2024: Follow-up venous stasis ulcer of the left calf.  Doing well.  No complaints.  Now wearing Jobst 20-30Hg mm compression stockings.    10/17/2024: Follow-up venous stasis ulcer of the left calf.  Continue using Hydrofera Blue.  Notes improvement.  No other complaints.    10/3/24: Patient presents to wound care center for follow-up visit left lower extremity venous ulcer.  Patient has been using Hydrofera Blue to the wound.  Patient denies increased pain or drainage from the wound.  Patient uses her own compression stockings for edema management.  No fever or chills.  Patient states that she has a follow-up appointment with vascular on 10/22/2024.    8/22/24: Patient presents to wound healing center for follow-up visit of left lower extremity venous ulcer.  Patient has been using endoform and Hydrofera Blue to the wound.  Patient states that she has had issues with obtaining wound care supplies through her vendor however this has been rectified and she has received her supplies.  Patient wears her own compression and is tolerating it well.  Patient offers no wound related complaints, denies increased pain or drainage.   Denies fever or chills.        The following portions of the patient's history were reviewed and updated as appropriate:   Patient Active Problem List   Diagnosis    Venous stasis ulcer of left calf with fat layer exposed (HCC)    Venous stasis ulcer of left calf with fat layer exposed, unspecified whether varicose veins present (HCC)     Past Medical History:   Diagnosis Date    Hypercholesterolemia      No past surgical history on file.  Family History   Problem Relation Age of Onset    Heart disease Father     Colon cancer Maternal Grandmother       Social History     Socioeconomic History    Marital status: Single     Spouse name: Not on file    Number of children: Not on file    Years of education: Not on file    Highest education level: Not on file   Occupational History    Not on file   Tobacco Use    Smoking status: Never    Smokeless tobacco: Not on file   Vaping Use    Vaping status: Never Used   Substance and Sexual Activity    Alcohol use: Not on file    Drug use: Never    Sexual activity: Not on file   Other Topics Concern    Not on file   Social History Narrative    Not on file     Social Determinants of Health     Financial Resource Strain: Not on file   Food Insecurity: Not on file   Transportation Needs: Not on file   Physical Activity: Not on file   Stress: Not on file   Social Connections: Not on file   Intimate Partner Violence: Not on file   Housing Stability: Not on file        Current Outpatient Medications:     cetirizine (ZyrTEC Allergy) 10 mg tablet, Take 10 mg by mouth daily, Disp: , Rfl:     desvenlafaxine (PRISTIQ) 100 mg 24 hr tablet, Take 100 mg by mouth daily, Disp: , Rfl:     LaMICtal 25 MG tablet, Take 50 mg by mouth daily, Disp: , Rfl:     montelukast (SINGULAIR) 10 mg tablet, Take 10 mg by mouth daily at bedtime, Disp: , Rfl:     Multiple Vitamins-Minerals (MULTIVITAMIN ADULTS 50+ PO), Multivitamin, Disp: , Rfl:     simvastatin (ZOCOR) 20 mg tablet, Take 20 mg by mouth daily,  Disp: , Rfl:     topiramate (TOPAMAX) 50 MG tablet, Take 50 mg by mouth daily, Disp: , Rfl:     Topiramate ER 50 MG CP24, Take 50 mg by mouth daily, Disp: , Rfl:     Triamcinolone Acetonide (Nasacort Allergy) 55 MCG/ACT nasal spray, 2 sprays into each nostril daily, Disp: , Rfl:     zolpidem (AMBIEN CR) 12.5 MG CR tablet, daily at bedtime as needed, Disp: , Rfl:     Review of Systems   Constitutional:  Negative for chills and fever.   HENT:  Negative for congestion and sneezing.    Respiratory:  Negative for cough and shortness of breath.    Cardiovascular:  Positive for leg swelling.   Skin:  Positive for wound (Left calf).        LLE   Psychiatric/Behavioral:  Negative for dysphoric mood. The patient is not nervous/anxious.        Objective:  There were no vitals taken for this visit.        Physical Exam  Vitals and nursing note reviewed.   Constitutional:       Appearance: Normal appearance. She is well-developed.   HENT:      Head: Normocephalic and atraumatic.   Cardiovascular:      Rate and Rhythm: Normal rate.   Pulmonary:      Effort: Pulmonary effort is normal.   Musculoskeletal:      Left lower le+ Edema present.   Skin:     General: Skin is warm and dry.      Findings: No erythema or wound.             Comments: Chronic hyperpigmentation in the left lateral calf.  Ulcer is now closed.   Neurological:      Mental Status: She is alert and oriented to person, place, and time.   Psychiatric:         Attention and Perception: Attention normal.         Mood and Affect: Mood and affect normal.         Behavior: Behavior is cooperative.         Cognition and Memory: Cognition normal.         Wound 24 Venous Ulcer Pretibial Left;Lateral (Active)   Wound Image Images linked 24   Wound Description Epithelialization 24   Wound Length (cm) 0 cm 24   Wound Width (cm) 0 cm 24   Wound Depth (cm) 0 cm 24   Wound Surface Area (cm^2) 0 cm^2 24  "  Wound Volume (cm^3) 0 cm^3 11/07/24 0938   Calculated Wound Volume (cm^3) 0 cm^3 11/07/24 0938   Change in Wound Size % 100 11/07/24 0938   Drainage Amount None 11/07/24 0938   Dressing Status Intact 11/07/24 0938                  Lab Results   Component Value Date    HGBA1C 5.5 08/18/2023       Wound Instructions:  Orders Placed This Encounter   Procedures    Wound cleansing and dressings Venous Ulcer Left;Lateral Pretibial     Your wound is healed. Continue good daily skin. Moisturize daily and wear your compression daily, may remove at night for comfort.  Call wound care center if wound re-opens.  Thank you for choosing [facility] wound care center     Standing Status:   Future     Standing Expiration Date:   11/7/2024             Kodak Romero MD, CHT, CWS    Portions of the record may have been created with voice recognition software. Occasional wrong word or \"sound alike\" substitutions may have occurred due to the inherent limitations of voice recognition software. Read the chart carefully and recognize, using context, where substitutions have occurred.    "

## 2024-11-07 NOTE — PATIENT INSTRUCTIONS
Orders Placed This Encounter   Procedures    Wound cleansing and dressings Venous Ulcer Left;Lateral Pretibial     Your wound is healed. Continue good daily skin. Moisturize daily and wear your compression daily, may remove at night for comfort.  Call wound care center if wound re-opens.  Thank you for choosing [facility] wound care center     Standing Status:   Future     Standing Expiration Date:   11/7/2024

## 2024-11-07 NOTE — PROGRESS NOTES
Wound Procedure Treatment Venous Ulcer Left;Lateral Pretibial    Performed by: Ann Marie Luciano RN  Authorized by: Kodak Romero MD    Associated wounds:   Wound 07/25/24 Venous Ulcer Pretibial Left;Lateral  Applied to periwound:  Moisture lotion    Personal compression stocking

## 2024-11-13 ENCOUNTER — HOSPITAL ENCOUNTER (OUTPATIENT)
Dept: NON INVASIVE DIAGNOSTICS | Facility: HOSPITAL | Age: 49
Discharge: HOME | End: 2024-11-13
Payer: COMMERCIAL

## 2024-11-13 DIAGNOSIS — L97.222 VENOUS STASIS ULCER OF LEFT CALF WITH FAT LAYER EXPOSED, UNSPECIFIED WHETHER VARICOSE VEINS PRESENT (HCC): ICD-10-CM

## 2024-11-13 DIAGNOSIS — I83.022 VENOUS STASIS ULCER OF LEFT CALF WITH FAT LAYER EXPOSED, UNSPECIFIED WHETHER VARICOSE VEINS PRESENT (HCC): ICD-10-CM

## 2024-11-13 PROCEDURE — 93971 EXTREMITY STUDY: CPT

## 2024-11-15 PROCEDURE — 93971 EXTREMITY STUDY: CPT | Performed by: SURGERY

## 2024-11-18 NOTE — PROGRESS NOTES
Vascular Surgery Return Patient Visit  Date: 11/19/24      Assessment:  Johanny Rodriguez is a 49 y.o. 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 Now that she has been receiving consistent local wound care, the wounds have healed. The wounds were initially thought to be the result of venous stasis, but she does not have any findings to suggest venous reflux on her current imaging. She also does not have any varicosities, pain, skin changes, or swelling that one would expect to accompany venous stasis or lymphedema. She also has palpable pedal pulses and normal ABIs. Based on the above, do not think that her wounds are of arterial or venous origin.    Plan:  - Continue local wound care as needed   - Follow up with vascular as needed     Diagnoses and all orders for this visit:    Wound of left lower extremity, initial encounter           Subjective:     HPI:  Johanny Rodriguez is a 49 y.o. female with history of left lateral calf wound. This has been present since 8/2023 and she has been seeing wound care at [facility] since it was first noticed. Denies any known trauma or other inciting event to the area. Fortunately with local wound care this has healed (images in media).    She denies any significant heaviness, throbbing, aching discomfort, or varicose veins. She did have a left anterior shin quarter sized ulceration in 2023, which healed with 3 months of local wound care. She has been wearing compression stockings she purchased on [employer]. She was given a prescription for 20-30mmhg compression stockings at her last visit.    Venous insufficiency study obtained at [facility] on 9/20/23 demonstrated only incompetence of the bilateral SPJs. A LLE insufficiency study was repeated in the [facility] system which again indicated no deep or superficial insufficiency, however the SPJ finding was not re-demonstrated.     She also had ABIs in the [facility] system which are outlined below.    ABIs:  Date 9/19/2023 ([facility])    Right  "1.2, no TP obtained    Left 1.05, no TP obtained            Objective:    ROS:  All systems were reviewed and are negative except those mentioned in HPI and below.      Vitals: /60 (BP Location: Left arm, Patient Position: Sitting, Cuff Size: Large)   Pulse 88   Ht 5' 3\" (1.6 m)   Wt 123 kg (271 lb 1.6 oz)   SpO2 98%   BMI 48.02 kg/m²      General: female appears stated age, no apparent distress, alert and oriented   HEENT: normocephalic, atraumatic   Cardiovascular: hemodynamically stable   Chest/Lungs: no increased work of breathing, chest rise equal bilaterally   Abdomen: Soft, ND, NT, no pulsatile masses   Extremities: DP/PT pulses bilaterally  No swelling or varicosities   Left calf and shin wounds healed (images in media)   Skin: warm and dry   Neuro: no gross deficits      Medications:  Current Outpatient Medications   Medication Sig Dispense Refill    cetirizine (ZyrTEC Allergy) 10 mg tablet Take 10 mg by mouth daily      desvenlafaxine (PRISTIQ) 100 mg 24 hr tablet Take 100 mg by mouth daily      LaMICtal 25 MG tablet Take 50 mg by mouth daily      montelukast (SINGULAIR) 10 mg tablet Take 10 mg by mouth daily at bedtime      Multiple Vitamins-Minerals (MULTIVITAMIN ADULTS 50+ PO) Multivitamin      simvastatin (ZOCOR) 20 mg tablet Take 20 mg by mouth daily      topiramate (TOPAMAX) 50 MG tablet Take 50 mg by mouth daily      Triamcinolone Acetonide (Nasacort Allergy) 55 MCG/ACT nasal spray 2 sprays into each nostril daily      zolpidem (AMBIEN CR) 12.5 MG CR tablet daily at bedtime as needed      Topiramate ER 50 MG CP24 Take 50 mg by mouth daily (Patient not taking: Reported on 11/19/2024)       No current facility-administered medications for this visit.       Allergies:  Allergies   Allergen Reactions    Antihistamines, Diphenhydramine-Type Wheezing    Lactose - Food Allergy Other (See Comments)     Stomach issues    Latex Dermatitis, Hives, Itching, Other (See Comments) and Rash     ITCHY " HANDS        PMH:  Past Medical History:   Diagnosis Date    Hypercholesterolemia         PSH:  History reviewed. No pertinent surgical history.     FHx:  Family History   Problem Relation Age of Onset    Heart disease Father     Colon cancer Maternal Grandmother         SHx:  Social History     Socioeconomic History    Marital status: Single     Spouse name: Not on file    Number of children: Not on file    Years of education: Not on file    Highest education level: Not on file   Occupational History    Not on file   Tobacco Use    Smoking status: Never    Smokeless tobacco: Not on file   Vaping Use    Vaping status: Never Used   Substance and Sexual Activity    Alcohol use: Not on file    Drug use: Never    Sexual activity: Not on file   Other Topics Concern    Not on file   Social History Narrative    Not on file     Social Drivers of Health     Financial Resource Strain: Not on file   Food Insecurity: Not on file   Transportation Needs: Not on file   Physical Activity: Not on file   Stress: Not on file   Social Connections: Not on file   Intimate Partner Violence: Not on file   Housing Stability: Not on file

## 2024-11-19 ENCOUNTER — TELEPHONE (OUTPATIENT)
Age: 49
End: 2024-11-19

## 2024-11-19 ENCOUNTER — OFFICE VISIT (OUTPATIENT)
Dept: VASCULAR SURGERY | Facility: CLINIC | Age: 49
End: 2024-11-19
Payer: COMMERCIAL

## 2024-11-19 VITALS
WEIGHT: 271.1 LBS | DIASTOLIC BLOOD PRESSURE: 60 MMHG | HEART RATE: 88 BPM | HEIGHT: 63 IN | OXYGEN SATURATION: 98 % | BODY MASS INDEX: 48.04 KG/M2 | SYSTOLIC BLOOD PRESSURE: 100 MMHG

## 2024-11-19 DIAGNOSIS — S81.802A WOUND OF LEFT LOWER EXTREMITY, INITIAL ENCOUNTER: Primary | ICD-10-CM

## 2024-11-19 PROCEDURE — 99214 OFFICE O/P EST MOD 30 MIN: CPT | Performed by: STUDENT IN AN ORGANIZED HEALTH CARE EDUCATION/TRAINING PROGRAM

## 2024-11-19 NOTE — TELEPHONE ENCOUNTER
Caller:  Johanny Rodriguez    Doctor:  Dr. Doyle     Reason for call:  Patient called stating she may be late for her appt at 8:30am advised she will need to be here before 8:45am.     Patient asked if she were to need to reschedule can it be virtual due to her drive being an hour away.    Call back#:  [phone]

## 2025-03-29 DIAGNOSIS — Z00.6 ENCOUNTER FOR EXAMINATION FOR NORMAL COMPARISON OR CONTROL IN CLINICAL RESEARCH PROGRAM: ICD-10-CM

## 2025-04-05 ENCOUNTER — APPOINTMENT (OUTPATIENT)
Dept: LAB | Facility: CLINIC | Age: 50
End: 2025-04-05

## 2025-04-05 DIAGNOSIS — Z00.6 ENCOUNTER FOR EXAMINATION FOR NORMAL COMPARISON OR CONTROL IN CLINICAL RESEARCH PROGRAM: ICD-10-CM

## 2025-04-05 PROCEDURE — 36415 COLL VENOUS BLD VENIPUNCTURE: CPT

## 2025-04-15 LAB
APOB+LDLR+PCSK9 GENE MUT ANL BLD/T: NOT DETECTED
BRCA1+BRCA2 DEL+DUP + FULL MUT ANL BLD/T: NOT DETECTED
MLH1+MSH2+MSH6+PMS2 GN DEL+DUP+FUL M: NOT DETECTED